# Patient Record
Sex: MALE | Race: WHITE | NOT HISPANIC OR LATINO | Employment: OTHER | URBAN - METROPOLITAN AREA
[De-identification: names, ages, dates, MRNs, and addresses within clinical notes are randomized per-mention and may not be internally consistent; named-entity substitution may affect disease eponyms.]

---

## 2022-08-11 ENCOUNTER — TELEPHONE (OUTPATIENT)
Dept: PRIMARY CARE CLINIC | Facility: CLINIC | Age: 77
End: 2022-08-11
Payer: MEDICARE

## 2022-08-11 NOTE — TELEPHONE ENCOUNTER
Patient was contacted in regards to getting scheduled with [facility] He stated that his daughter has been handling things for him & she lives in [place]. He wants to get with his daughter first (before scheduling). He says he will then call back to schedule to establish care.

## 2022-11-21 ENCOUNTER — OFFICE VISIT (OUTPATIENT)
Dept: PRIMARY CARE CLINIC | Facility: CLINIC | Age: 77
End: 2022-11-21
Payer: MEDICARE

## 2022-11-21 VITALS
SYSTOLIC BLOOD PRESSURE: 128 MMHG | OXYGEN SATURATION: 95 % | HEIGHT: 72 IN | TEMPERATURE: 98 F | HEART RATE: 62 BPM | BODY MASS INDEX: 32.69 KG/M2 | WEIGHT: 241.31 LBS | DIASTOLIC BLOOD PRESSURE: 60 MMHG

## 2022-11-21 DIAGNOSIS — E11.21 DIABETIC NEPHROPATHY ASSOCIATED WITH TYPE 2 DIABETES MELLITUS: ICD-10-CM

## 2022-11-21 DIAGNOSIS — Z12.5 PROSTATE CANCER SCREENING: ICD-10-CM

## 2022-11-21 DIAGNOSIS — H02.834 DERMATOCHALASIS OF BOTH UPPER EYELIDS: Chronic | ICD-10-CM

## 2022-11-21 DIAGNOSIS — E66.9 OBESITY (BMI 30.0-34.9): Chronic | ICD-10-CM

## 2022-11-21 DIAGNOSIS — H02.831 DERMATOCHALASIS OF BOTH UPPER EYELIDS: Chronic | ICD-10-CM

## 2022-11-21 DIAGNOSIS — G51.39 FACIAL NERVE SPASTICITY: Chronic | ICD-10-CM

## 2022-11-21 DIAGNOSIS — E11.638 TYPE 2 DIABETES MELLITUS WITH OTHER ORAL COMPLICATION, WITHOUT LONG-TERM CURRENT USE OF INSULIN: ICD-10-CM

## 2022-11-21 DIAGNOSIS — K21.9 GASTROESOPHAGEAL REFLUX DISEASE WITHOUT ESOPHAGITIS: Chronic | ICD-10-CM

## 2022-11-21 DIAGNOSIS — E11.65 TYPE 2 DIABETES MELLITUS WITH HYPERGLYCEMIA, WITHOUT LONG-TERM CURRENT USE OF INSULIN: Primary | ICD-10-CM

## 2022-11-21 DIAGNOSIS — R53.81 PHYSICAL DECONDITIONING: ICD-10-CM

## 2022-11-21 DIAGNOSIS — R35.0 URINARY FREQUENCY: ICD-10-CM

## 2022-11-21 DIAGNOSIS — G47.33 OBSTRUCTIVE SLEEP APNEA: Chronic | ICD-10-CM

## 2022-11-21 LAB
ALBUMIN/CREAT UR: 163.5 UG/MG (ref 0–30)
CREAT UR-MCNC: 104 MG/DL (ref 23–375)
MICROALBUMIN UR DL<=1MG/L-MCNC: 170 UG/ML

## 2022-11-21 PROCEDURE — 3078F PR MOST RECENT DIASTOLIC BLOOD PRESSURE < 80 MM HG: ICD-10-PCS | Mod: CPTII,S$GLB,, | Performed by: INTERNAL MEDICINE

## 2022-11-21 PROCEDURE — 1101F PR PT FALLS ASSESS DOC 0-1 FALLS W/OUT INJ PAST YR: ICD-10-PCS | Mod: CPTII,S$GLB,, | Performed by: INTERNAL MEDICINE

## 2022-11-21 PROCEDURE — 99999 PR PBB SHADOW E&M-EST. PATIENT-LVL V: CPT | Mod: PBBFAC,,, | Performed by: INTERNAL MEDICINE

## 2022-11-21 PROCEDURE — 82043 UR ALBUMIN QUANTITATIVE: CPT | Performed by: INTERNAL MEDICINE

## 2022-11-21 PROCEDURE — 1101F PT FALLS ASSESS-DOCD LE1/YR: CPT | Mod: CPTII,S$GLB,, | Performed by: INTERNAL MEDICINE

## 2022-11-21 PROCEDURE — 99999 PR PBB SHADOW E&M-EST. PATIENT-LVL V: ICD-10-PCS | Mod: PBBFAC,,, | Performed by: INTERNAL MEDICINE

## 2022-11-21 PROCEDURE — 99417 PR PROLONGED SVC, OUTPT, W/WO DIRECT PT CONTACT,  EA ADDTL 15 MIN: ICD-10-PCS | Mod: S$GLB,,, | Performed by: INTERNAL MEDICINE

## 2022-11-21 PROCEDURE — 99417 PROLNG OP E/M EACH 15 MIN: CPT | Mod: S$GLB,,, | Performed by: INTERNAL MEDICINE

## 2022-11-21 PROCEDURE — 1159F PR MEDICATION LIST DOCUMENTED IN MEDICAL RECORD: ICD-10-PCS | Mod: CPTII,S$GLB,, | Performed by: INTERNAL MEDICINE

## 2022-11-21 PROCEDURE — 83036 HEMOGLOBIN GLYCOSYLATED A1C: CPT | Performed by: INTERNAL MEDICINE

## 2022-11-21 PROCEDURE — 1159F MED LIST DOCD IN RCRD: CPT | Mod: CPTII,S$GLB,, | Performed by: INTERNAL MEDICINE

## 2022-11-21 PROCEDURE — 3288F PR FALLS RISK ASSESSMENT DOCUMENTED: ICD-10-PCS | Mod: CPTII,S$GLB,, | Performed by: INTERNAL MEDICINE

## 2022-11-21 PROCEDURE — 3074F PR MOST RECENT SYSTOLIC BLOOD PRESSURE < 130 MM HG: ICD-10-PCS | Mod: CPTII,S$GLB,, | Performed by: INTERNAL MEDICINE

## 2022-11-21 PROCEDURE — 99215 PR OFFICE/OUTPT VISIT, EST, LEVL V, 40-54 MIN: ICD-10-PCS | Mod: S$GLB,,, | Performed by: INTERNAL MEDICINE

## 2022-11-21 PROCEDURE — 84153 ASSAY OF PSA TOTAL: CPT | Performed by: INTERNAL MEDICINE

## 2022-11-21 PROCEDURE — 82570 ASSAY OF URINE CREATININE: CPT | Performed by: INTERNAL MEDICINE

## 2022-11-21 PROCEDURE — 1125F AMNT PAIN NOTED PAIN PRSNT: CPT | Mod: CPTII,S$GLB,, | Performed by: INTERNAL MEDICINE

## 2022-11-21 PROCEDURE — 3074F SYST BP LT 130 MM HG: CPT | Mod: CPTII,S$GLB,, | Performed by: INTERNAL MEDICINE

## 2022-11-21 PROCEDURE — 3288F FALL RISK ASSESSMENT DOCD: CPT | Mod: CPTII,S$GLB,, | Performed by: INTERNAL MEDICINE

## 2022-11-21 PROCEDURE — 99215 OFFICE O/P EST HI 40 MIN: CPT | Mod: S$GLB,,, | Performed by: INTERNAL MEDICINE

## 2022-11-21 PROCEDURE — 3078F DIAST BP <80 MM HG: CPT | Mod: CPTII,S$GLB,, | Performed by: INTERNAL MEDICINE

## 2022-11-21 PROCEDURE — 1125F PR PAIN SEVERITY QUANTIFIED, PAIN PRESENT: ICD-10-PCS | Mod: CPTII,S$GLB,, | Performed by: INTERNAL MEDICINE

## 2022-11-21 RX ORDER — POTASSIUM CHLORIDE 750 MG/1
10 TABLET, EXTENDED RELEASE ORAL
COMMUNITY
Start: 2020-11-20

## 2022-11-21 RX ORDER — AMIODARONE HYDROCHLORIDE 200 MG/1
200 TABLET ORAL DAILY
COMMUNITY
Start: 2020-05-01

## 2022-11-21 RX ORDER — BLOOD GLUCOSE CONTROL HIGH,LOW
EACH MISCELLANEOUS
COMMUNITY
End: 2022-11-21

## 2022-11-21 RX ORDER — CHOLESTYRAMINE 4 G/9G
POWDER, FOR SUSPENSION ORAL
COMMUNITY
Start: 2022-08-03 | End: 2023-04-17

## 2022-11-21 RX ORDER — METOPROLOL SUCCINATE 100 MG/1
100 TABLET, EXTENDED RELEASE ORAL DAILY
COMMUNITY
Start: 2020-11-20 | End: 2023-06-22 | Stop reason: ALTCHOICE

## 2022-11-21 RX ORDER — OMEPRAZOLE 40 MG/1
40 CAPSULE, DELAYED RELEASE ORAL
COMMUNITY

## 2022-11-21 RX ORDER — GLYBURIDE-METFORMIN HYDROCHLORIDE 2.5; 5 MG/1; MG/1
TABLET ORAL 2 TIMES DAILY WITH MEALS
COMMUNITY
Start: 2015-11-20 | End: 2023-05-01 | Stop reason: ALTCHOICE

## 2022-11-21 RX ORDER — SODIUM, POTASSIUM,MAG SULFATES 17.5-3.13G
SOLUTION, RECONSTITUTED, ORAL ORAL
COMMUNITY
Start: 2022-09-02

## 2022-11-21 RX ORDER — FUROSEMIDE 20 MG/1
20 TABLET ORAL
COMMUNITY
Start: 2020-11-20

## 2022-11-21 RX ORDER — OMEPRAZOLE 10 MG/1
40 CAPSULE, DELAYED RELEASE ORAL
COMMUNITY
Start: 2015-11-20 | End: 2023-04-17

## 2022-11-21 NOTE — PATIENT INSTRUCTIONS
Recommend designate HCPOA     Limit intake of simple carbs, sugary beverages, sweets    Move more - daily am natural light exposure

## 2022-11-21 NOTE — PROGRESS NOTES
Freddie Palacio  11/21/2022  [id number]    Kevyn Louise MD  Patient Care Team:  Kevyn Louise MD as PCP - General (Family Medicine)    Visit Type: establish care [facility]    Chief Complaint:  Chief Complaint   Patient presents with    Establish Care     History of Present Illness: Mr. Freddie Palacio 77 year old male. Current PCP Dr. Louise - last seen? PCP's TERRA Leslie?    Has seen a number of specialists w [facility] in [place] - daughter Renetta Grimes works there. Ms. Grimes is here w her father today. She visits in person every few months and manages his patient portal. Daughter Marilyn Jackson lives nearby in [place]. Mr. Palacio lives w his long-term , Gerda Parker, who is a nurse.     Officially retired but still working as an . Has noted some word finding difficulty recently and is a bit anxious about his cognition. Some of this may be related to persistent L sided facial spasticity making it more difficult to form and articulate certain words (which then increases his anxiety etc).    Going through dental extractions - using a removable dental device to assist w chewing.    Medical issues include:  PAF w Long term (current) use of anticoagulants;   Non-rheumatic aortic regurgitation;   Non-rheumatic mitral regurgitation;   Essential hypertension;   Dyslipidemia (low HDL)  Obstructive sleep apnea (difficulty w CPAP)  Diverticulosis w frequent diverticulitis flares   History of colon polyps;   Gastroesophageal reflux disease without esophagitis;   Type 2 diabetes mellitus    Sensorineural hearing loss (SNHL) of left ear with restricted hearing of right ear;   Bell's palsy w cont Facial nerve spasticity  L Blepharospasm  Ptosis L eyelid  Nasal congestion  Urinary frequency/polyuria/nocturia (due to diuretics? excessive fluid intake? DAFNE?)  Anxiety (particularly related to work)  Disrupted sleep (due to anxiety, DAFNE, nocturia)    Recent appointments:   10/14/22 [facility]  and [facility] Hearing loss  10/12/22 [facility] Bell's Palsy (Jan 21)  9/19/22 [facility] and Balance Dr. Ann  9/07/22 [facility] Dr. Dunne  9/02/22 [facility] GI Dr. Polanco   8/18/22 [facility] Plastic Surgery Dr. Salmeron    Upcoming appointments:  CT head for ears in Jan  Eye surgery in Jan?  Colonoscopy in Jan  Urology virtual Q 3 mos    Future Appointments       Date Provider Specialty Appt Notes    12/5/2022 Mirna Smith MD Primary Care f/u          The following were reviewed: Active problem list, medication list, allergies, family history, social history, and Health Maintenance.     History:  Past Medical History:   Diagnosis Date    A-fib     Bell's palsy     Benign prostatic hyperplasia without lower urinary tract symptoms     CHF (congestive heart failure)     Diabetes mellitus, type 2     Diverticulitis     GERD (gastroesophageal reflux disease)     Hypertension     Obstructive sleep apnea     Phimosis     Seasonal allergies      Past Surgical History:   Procedure Laterality Date    CARDIOVERSION      CATARACT EXTRACTION Right     Both eyes    CHOLECYSTECTOMY       Family History   Problem Relation Age of Onset    Diverticulitis Mother     COPD Mother     Diabetes Father     Stomach cancer Father     Diverticulitis Sister     Colon cancer Brother     Diabetes Maternal Grandmother     Heart failure Paternal Grandfather      Social History     Socioeconomic History    Marital status: Single   Tobacco Use    Smoking status: Never   Substance and Sexual Activity    Alcohol use: No    Drug use: No    Sexual activity: Not Currently     Partners: Female     Patient Active Problem List   Diagnosis    Obstructive sleep apnea    Facial nerve spasticity    Diabetes mellitus, type 2    GERD (gastroesophageal reflux disease)    Urinary frequency    Diabetic nephropathy associated with type 2 diabetes mellitus    Obesity (BMI 30.0-34.9)    Dermatochalasis of both upper eyelids        Review of patient's allergies indicates:   Allergen Reactions    Sulfa (sulfonamide antibiotics) Itching    Sulfamethoxazole-trimethoprim Itching and Rash    (d)-limonene flavor        Medications:  Current Outpatient Medications on File Prior to Visit   Medication Sig Dispense Refill    amiodarone (PACERONE) 200 MG Tab Take 200 mg by mouth once daily.      amlodipine (NORVASC) 10 MG tablet Take 5 mg by mouth 2 (two) times daily.      apixaban (ELIQUIS) 5 mg Tab Take 5 mg by mouth 2 (two) times daily.      cholestyramine (QUESTRAN) 4 gram packet DISSOLVE 1 PACKET IN LIQUID AND TAKE BY MOUTH THREE TIMES DAILY WITH MEALS      furosemide (LASIX) 20 MG tablet Take 20 mg by mouth.      glyBURIDE-metformin (GLUCOVANCE) 2.5-500 mg per tablet 2 (two) times daily with meals.      lisinopril-hydrochlorothiazide (PRINZIDE,ZESTORETIC) 20-25 mg Tab Take 1 tablet by mouth once daily.      metoprolol succinate (TOPROL-XL) 100 MG 24 hr tablet 100 mg once daily.      potassium chloride (KLOR-CON) 10 MEQ TbSR Take 10 mEq by mouth.      sodium,potassium,mag sulfates (SUPREP BOWEL PREP KIT) 17.5-3.13-1.6 gram SolR Take one bottle on the evening prior to your exam at 6 PM and the other bottle on the morning of your exam 4 hours prior to your arrival.      terazosin (HYTRIN) 10 MG capsule Take 10 mg by mouth every evening.      omeprazole (PRILOSEC) 10 MG capsule       omeprazole (PRILOSEC) 40 MG capsule Take 40 mg by mouth.       No current facility-administered medications on file prior to visit.     Medications have been reviewed and reconciled with patient at visit today.    Barriers to medications present (no )    Adverse reactions to current medications (yes)  Excessive urination and nocturia    Over the counter medications reviewed (Yes) and if needed added to active Medication list.    Review of Systems   Constitutional:  Positive for diaphoresis and fatigue. Negative for activity change, appetite change and unexpected  weight change.        Intermittent night sweats x 6 mos (possibly due to low Bss overnight?)   HENT:  Positive for dental problem and hearing loss. Negative for ear pain and sinus pressure/congestion.    Eyes:  Positive for visual disturbance. Negative for pain.        Due to upper eye lids and L eye ptosis/spasm  S/p cataract surgery  Wears readers   Respiratory:  Positive for apnea.         PND   Cardiovascular:  Negative for chest pain, palpitations and leg swelling.   Gastrointestinal:  Positive for reflux.        Frequent diverticulitis flares   Endocrine: Positive for polyuria.   Genitourinary:  Positive for frequency. Negative for bladder incontinence and difficulty urinating.        Nocturesis 2-3 x night but sometimes every 15 minutes.  Daytime Qhr urination.    Neurological:         Some word finding trouble   Psychiatric/Behavioral:  Positive for sleep disturbance. The patient is nervous/anxious.       Exam:  Initial /67 Pulse 62 sat 95% T 98.2  Vitals:    11/21/22 1442   BP: 128/60   Pulse:    Temp:      Weight: 109.5 kg (241 lb 4.8 oz)   Body mass index is 33.19 kg/m².    BP Readings from Last 3 Encounters:   11/21/22 128/60   07/06/15 132/72   07/01/15 118/78      Physical Exam  Vitals reviewed.   Constitutional:       General: He is not in acute distress.     Appearance: Normal appearance. He is obese. He is not ill-appearing.   HENT:      Head: Normocephalic and atraumatic.      Right Ear: Ear canal and external ear normal.      Left Ear: Ear canal and external ear normal.      Ears:      Comments: R TM top portion abnormal     Nose: Nose normal. No congestion or rhinorrhea.      Comments: Deviated septum     Mouth/Throat:      Mouth: Mucous membranes are moist.      Pharynx: Oropharynx is clear. No oropharyngeal exudate or posterior oropharyngeal erythema.   Eyes:      General:         Right eye: No discharge.         Left eye: No discharge.      Extraocular Movements: Extraocular movements  intact.      Conjunctiva/sclera: Conjunctivae normal.   Neck:      Vascular: No carotid bruit.   Cardiovascular:      Rate and Rhythm: Normal rate. Rhythm irregular.   Pulmonary:      Effort: Pulmonary effort is normal.      Breath sounds: Normal breath sounds. No wheezing, rhonchi or rales.   Abdominal:      General: Bowel sounds are normal.      Palpations: Abdomen is soft.      Tenderness: There is no abdominal tenderness. There is no guarding.      Comments: Scar mid R abd from prior GB surgery   Musculoskeletal:      Right lower leg: No edema.      Left lower leg: No edema.   Lymphadenopathy:      Cervical: No cervical adenopathy.   Skin:     General: Skin is warm and dry.   Neurological:      Mental Status: He is alert and oriented to person, place, and time. Mental status is at baseline.      Motor: No weakness.      Gait: Gait normal.      Comments: L sided facial droop  Mild discoordination L finger-to-nose test   Psychiatric:         Mood and Affect: Mood normal.         Behavior: Behavior normal.         Thought Content: Thought content normal.         Judgment: Judgment normal.      Laboratory Reviewed: (Yes)  8/02/22 CBC wnl CMP glucose 141 Na 146 K 3.4 eGFR 72 chol 115 TG 79 HDL 27 LDL 72 TSH 1.550  11/07/21 HCV NR    Assessment:   77 y.o. male with multiple co-morbid illnesses here for continued follow up of medical problems.      The primary encounter diagnosis was Type 2 diabetes mellitus with hyperglycemia, without long-term current use of insulin. Diagnoses of Urinary frequency, Prostate cancer screening, Physical deconditioning, Obstructive sleep apnea, Facial nerve spasticity, Type 2 diabetes mellitus with other oral complication, without long-term current use of insulin, Gastroesophageal reflux disease without esophagitis, Diabetic nephropathy associated with type 2 diabetes mellitus, Obesity (BMI 30.0-34.9), and Dermatochalasis of both upper eyelids were also pertinent to this  visit.      Plan:     Problem List Items Addressed This Visit          Neuro    Facial nerve spasticity (Chronic)     sequellae of Bell's palsy(?) on-set Jan 2021 -  receiving therapy through [facility] (speech, vision and hearing have been affected)            Ophtho    Dermatochalasis of both upper eyelids (Chronic)     Plan for surgery as affecting visual fields (exacerbated on L by facial nerve spasticity w ptosis and blepharospasm of L eye)            Renal/    Urinary frequency    Relevant Orders    PSA, Screening (Completed)    Microalbumin/creatinine urine ratio (Completed)    Diabetic nephropathy associated with type 2 diabetes mellitus     Note elevated microalb/crt ratio - f/u w pt next visit on optimal medical management         Relevant Medications    glyBURIDE-metformin (GLUCOVANCE) 2.5-500 mg per tablet       Endocrine    Obesity (BMI 30.0-34.9) (Chronic)     Discussed diet, movement, alternate Rx for diabetes         Diabetes mellitus, type 2 - Primary     Undergoing dental extractions - HgbA1c 6.3% - does not check Bs at home - reports intermittent tremors, night sweats - maybe related to low Bs's? consider hold pm glyburide-metformin?         Relevant Medications    glyBURIDE-metformin (GLUCOVANCE) 2.5-500 mg per tablet    Other Relevant Orders    Hemoglobin A1C (Completed)    Microalbumin/creatinine urine ratio (Completed)       GI    GERD (gastroesophageal reflux disease) (Chronic)     Cont PPI - clarify dose            Other    Obstructive sleep apnea (Chronic)     Reports difficulty w CPAP - consider contact sleep medicine specialist and/or supplier of CPAP to assist w troubleshooting?          Other Visit Diagnoses       Prostate cancer screening        Relevant Orders    PSA, Screening (Completed)    Physical deconditioning        Relevant Orders    Ambulatory referral/consult to Physical/Occupational Therapy            Health Maintenance         Date Due Completion Date    Hepatitis C Screening  Never done ---    Lipid Panel Never done ---    COVID-19 Vaccine (1) Never done ---    TETANUS VACCINE Never done ---    Shingles Vaccine (1 of 2) Never done ---    Pneumococcal Vaccines (Age 65+) (1 - PCV) Never done ---    Influenza Vaccine (1) Never done ---            -Patient's lab results were reviewed and discussed with patient  -Treatment options and alternatives were discussed with the patient. Patient expressed understanding. Patient was given the opportunity to ask questions and be an active participant in their medical care. Patient had no further questions or concerns at this time.   -Documentation of patient's health and condition was obtained from family member who was present during visit.  -Patient is an overall moderate risk for health complications from their medical conditions.     Follow up: Follow up in about 2 weeks (around 12/5/2022) for Follow Up.    After visit summary printed and given to patient upon discharge.  Patient care plan included in After visit summary.    TOTAL TIME evaluating and managing this patient for this encounter was greater than 90 minutes. This time was spent personally by me on the following activities: review of patient's past medical history, assessing age-appropriate health maintenance needs, review of any interval history, review and interpretation of lab results, review and interpretation of imaging test results, review and interpretation of cardiology test results, reviewing consulting specialist notes, obtaining history from the patient and family, examination of the patient, medication reconciliation, managing and/or ordering prescription medications, ordering imaging tests, ordering referral to subspecialty provider(s), educating patient and answering their questions about diagnosis, treatment plan, and goals of treatment, discussing planned follow-up and final documentation of the visit. This time was exclusive of any separately billable procedures for this  patient and exclusive of time spent treating any other patients.     Addendum  11/21/22: PSA 2.2 HgbA1c 6.3% urine microalb/crt ratio 163.5 (H)    Review of Medical Records:  [facility] [place]  8/05/22 Nephrology Dr. Tiny Barbosa creatinine normal 1.0 recommend glimepiride metformin in a.m. only, terazosin at night, check blood sugar and vitals with night sweats, limit fluids to 2-2.5 L per day, consider for continuous glucose monitor, PT, move more.  8/04/22 Ophthalmology Dr. Yonathan Ruffin lubricating drops 3-4 times daily.  8/01/22 Colorectal surgeon Dr. Grace recurrent diverticulitis, diverticulosis. Plan for colonoscopy, fiber.  8/01/22 Urologist Dr. Raymond limit fluids after 6:00 p.m. elevate feet after dinner elevate head in bed  7/30/22 Cardiology Dr. Dunne consider change amiodarone to different medication for rhythm     Echocardiogram 08/05/2022:  Left ventricle mildly dilated moderate concentric hypertrophy normal LVEF 55-60% right ventricle normal in size with normal systolic function.  Moderate aortic regurgitation.  Moderate mitral regurgitation.  Sinuses of Valsalva mildly dilated.  Proximal ascending aorta mildly dilated.  Sinus rhythm.    NM CV PET stress 08/02/2022:  66% predicted max H are.  Frequent PVCs, nonsustained VT.  LV perfusion mildly abnormal small fixed left ventricular perfusion defect with mild reduction in uptake apical inferior, septal, and apex locations.     Labs 8/02/22:  Cholesterol 115 triglycerides 79 HDL 27 LDL 72 cholesterol/HDL ratio 4.3 glucose 141 BUN 12 creatinine 1.06 sodium 146 potassium 3.4 chloride 100 carbon dioxide 32 calcium 8.8 total protein 6.4 albumin 4.2 globulin 2.2 alk phos 85 AST 25 ALT 33 EGFR 72 T bili 0.6 for

## 2022-11-22 PROBLEM — R35.0 URINARY FREQUENCY: Status: ACTIVE | Noted: 2022-11-22

## 2022-11-22 LAB
COMPLEXED PSA SERPL-MCNC: 2.2 NG/ML (ref 0–4)
ESTIMATED AVG GLUCOSE: 134 MG/DL (ref 68–131)
HBA1C MFR BLD: 6.3 % (ref 4–5.6)

## 2022-11-22 NOTE — ASSESSMENT & PLAN NOTE
Undergoing dental extractions - HgbA1c 6.3% - does not check Bs at home - reports intermittent tremors, night sweats - maybe related to low Bs's? consider hold pm glyburide-metformin?

## 2022-11-22 NOTE — ASSESSMENT & PLAN NOTE
Reports difficulty w CPAP - consider contact sleep medicine specialist and/or supplier of CPAP to assist w troubleshooting?

## 2022-11-22 NOTE — ASSESSMENT & PLAN NOTE
Plan for surgery as affecting visual fields (exacerbated on L by facial nerve spasticity w ptosis and blepharospasm of L eye)

## 2022-11-22 NOTE — ASSESSMENT & PLAN NOTE
sequellae of Bell's palsy(?) on-set Jan 2021 -  receiving therapy through [facility] (speech, vision and hearing have been affected)

## 2022-12-05 ENCOUNTER — OFFICE VISIT (OUTPATIENT)
Dept: PRIMARY CARE CLINIC | Facility: CLINIC | Age: 77
End: 2022-12-05
Payer: MEDICARE

## 2022-12-05 ENCOUNTER — CLINICAL SUPPORT (OUTPATIENT)
Dept: REHABILITATION | Facility: HOSPITAL | Age: 77
End: 2022-12-05
Payer: MEDICARE

## 2022-12-05 VITALS
SYSTOLIC BLOOD PRESSURE: 136 MMHG | OXYGEN SATURATION: 95 % | DIASTOLIC BLOOD PRESSURE: 63 MMHG | TEMPERATURE: 98 F | WEIGHT: 238.69 LBS | BODY MASS INDEX: 32.33 KG/M2 | HEIGHT: 72 IN | HEART RATE: 59 BPM

## 2022-12-05 DIAGNOSIS — H02.834 DERMATOCHALASIS OF BOTH UPPER EYELIDS: Chronic | ICD-10-CM

## 2022-12-05 DIAGNOSIS — N40.1 BENIGN PROSTATIC HYPERPLASIA WITH URINARY FREQUENCY: ICD-10-CM

## 2022-12-05 DIAGNOSIS — Z74.09 DECREASED MOBILITY AND ENDURANCE: Chronic | ICD-10-CM

## 2022-12-05 DIAGNOSIS — H02.831 DERMATOCHALASIS OF BOTH UPPER EYELIDS: Chronic | ICD-10-CM

## 2022-12-05 DIAGNOSIS — R35.0 BENIGN PROSTATIC HYPERPLASIA WITH URINARY FREQUENCY: ICD-10-CM

## 2022-12-05 DIAGNOSIS — R53.81 PHYSICAL DECONDITIONING: ICD-10-CM

## 2022-12-05 DIAGNOSIS — I48.0 PAROXYSMAL ATRIAL FIBRILLATION: ICD-10-CM

## 2022-12-05 DIAGNOSIS — E11.21 DIABETIC NEPHROPATHY ASSOCIATED WITH TYPE 2 DIABETES MELLITUS: ICD-10-CM

## 2022-12-05 DIAGNOSIS — M25.562 CHRONIC PAIN OF LEFT KNEE: ICD-10-CM

## 2022-12-05 DIAGNOSIS — R35.0 URINARY FREQUENCY: ICD-10-CM

## 2022-12-05 DIAGNOSIS — R26.89 BALANCE DISORDER: ICD-10-CM

## 2022-12-05 DIAGNOSIS — K43.9 VENTRAL HERNIA WITHOUT OBSTRUCTION OR GANGRENE: ICD-10-CM

## 2022-12-05 DIAGNOSIS — Z74.09 DECREASED MOBILITY AND ENDURANCE: ICD-10-CM

## 2022-12-05 DIAGNOSIS — G89.29 CHRONIC PAIN OF LEFT KNEE: ICD-10-CM

## 2022-12-05 DIAGNOSIS — N30.00 ACUTE CYSTITIS WITHOUT HEMATURIA: Primary | ICD-10-CM

## 2022-12-05 PROCEDURE — 3075F PR MOST RECENT SYSTOLIC BLOOD PRESS GE 130-139MM HG: ICD-10-PCS | Mod: CPTII,S$GLB,, | Performed by: INTERNAL MEDICINE

## 2022-12-05 PROCEDURE — 1101F PT FALLS ASSESS-DOCD LE1/YR: CPT | Mod: CPTII,S$GLB,, | Performed by: INTERNAL MEDICINE

## 2022-12-05 PROCEDURE — 3075F SYST BP GE 130 - 139MM HG: CPT | Mod: CPTII,S$GLB,, | Performed by: INTERNAL MEDICINE

## 2022-12-05 PROCEDURE — 97162 PT EVAL MOD COMPLEX 30 MIN: CPT | Mod: PN

## 2022-12-05 PROCEDURE — 81003 URINALYSIS AUTO W/O SCOPE: CPT | Performed by: INTERNAL MEDICINE

## 2022-12-05 PROCEDURE — 99999 PR PBB SHADOW E&M-EST. PATIENT-LVL V: CPT | Mod: PBBFAC,,, | Performed by: INTERNAL MEDICINE

## 2022-12-05 PROCEDURE — 3288F FALL RISK ASSESSMENT DOCD: CPT | Mod: CPTII,S$GLB,, | Performed by: INTERNAL MEDICINE

## 2022-12-05 PROCEDURE — 1125F AMNT PAIN NOTED PAIN PRSNT: CPT | Mod: CPTII,S$GLB,, | Performed by: INTERNAL MEDICINE

## 2022-12-05 PROCEDURE — 1125F PR PAIN SEVERITY QUANTIFIED, PAIN PRESENT: ICD-10-PCS | Mod: CPTII,S$GLB,, | Performed by: INTERNAL MEDICINE

## 2022-12-05 PROCEDURE — 1101F PR PT FALLS ASSESS DOC 0-1 FALLS W/OUT INJ PAST YR: ICD-10-PCS | Mod: CPTII,S$GLB,, | Performed by: INTERNAL MEDICINE

## 2022-12-05 PROCEDURE — 99215 OFFICE O/P EST HI 40 MIN: CPT | Mod: S$GLB,,, | Performed by: INTERNAL MEDICINE

## 2022-12-05 PROCEDURE — 99215 PR OFFICE/OUTPT VISIT, EST, LEVL V, 40-54 MIN: ICD-10-PCS | Mod: S$GLB,,, | Performed by: INTERNAL MEDICINE

## 2022-12-05 PROCEDURE — 3288F PR FALLS RISK ASSESSMENT DOCUMENTED: ICD-10-PCS | Mod: CPTII,S$GLB,, | Performed by: INTERNAL MEDICINE

## 2022-12-05 PROCEDURE — 3078F DIAST BP <80 MM HG: CPT | Mod: CPTII,S$GLB,, | Performed by: INTERNAL MEDICINE

## 2022-12-05 PROCEDURE — 99999 PR PBB SHADOW E&M-EST. PATIENT-LVL V: ICD-10-PCS | Mod: PBBFAC,,, | Performed by: INTERNAL MEDICINE

## 2022-12-05 PROCEDURE — 3078F PR MOST RECENT DIASTOLIC BLOOD PRESSURE < 80 MM HG: ICD-10-PCS | Mod: CPTII,S$GLB,, | Performed by: INTERNAL MEDICINE

## 2022-12-05 NOTE — PROGRESS NOTES
Freddie Palacio  12/05/2022  [id number]    Mirna Smith MD  Patient Care Team:  Mirna Smith MD as PCP - General (Internal Medicine)  Una Gar NP as Nurse Practitioner (Family Medicine)    Visit Type:a scheduled routine follow-up visit    Chief Complaint:  Chief Complaint   Patient presents with    2 week f/u      History of Present Illness: Mr. Freddie Palacio 77 year old male here for f/u on initial visit.     Mr. Palacio has seen a number of specialists w [facility] in [place] - daughter Renetta Grimes works there. She visits in person every few months and manages Mr. Palacio's patient portal. Daughter Marilyn Jackson lives nearby in [place]. Mr. Palacio lives w his long-term , Gerda Parker, who is a nurse.      Officially retired but still working as an . Has noted some word finding difficulty recently and is a bit anxious about his cognition. Some of this may be related to persistent L sided facial spasticity making it more difficult to form and articulate certain words (which then increases his anxiety etc).     Going through dental extractions - using a removable dental device to assist w chewing.    12/5/22   B/P at goal today, 136/63. Does not routinely measure B/P at home    Bell's Palsy Jan 2020 w cont L Facial nerve spasticity  L Blepharospasm  Ptosis L eyelid  Has chronic drooping of bilateral upper eyelids. Specialist in [place] feels that patient should have surgery to correct lids as it would improve his vision. Eyelids are causing vision obstruction, decreased peripheral vision to left eye. Pt states that is hereditary. Pt states his eyes keep shutting and he gets very sleepy when trying to work and falls asleep easily. Recommended that raising computer monitor above eye level may help improve line of vision and alertness.    Again notes speech is altered, notices mostly when in court and having to speak quickly    Has not been getting out walking in AM light as  advised due to left knee pain.   Not taking analgesia or topicals for left knee pain/pressure.   Plans to meet w PT here today.     Discussed HCPOA with pt a second time - will provide booklet for him to take home and review w his partner and daughter(s)    Voices concerns about prostatitis onset 3 days ago. Symptoms - suprapubic discomfort, urinary frequency, increased nocturia. Taking Azo OTC with some relief.     Takes metamucil for chronic diarrhea which is helpful for preventing diverticulitis. Denies diverticulitis symptoms at present.     Last Urologist - Dr. Kevyn Louise? [facility] due to hx of prostatitis, urinary frequency. R/o UTI: U/A reflex to urine today.     Medical issues include:  PAF w Long term (current) use of anticoagulants;   Non-rheumatic aortic regurgitation;   Non-rheumatic mitral regurgitation;   Essential hypertension;   Dyslipidemia (low HDL)  Obstructive sleep apnea (difficulty w CPAP)  Diverticulosis w frequent diverticulitis flares   History of colon polyps;   Gastroesophageal reflux disease without esophagitis;   Type 2 diabetes mellitus    Sensorineural hearing loss (SNHL) of left ear with restricted hearing of right ear;   Bell's palsy w cont Facial nerve spasticity  L Blepharospasm  Ptosis L eyelid  Nasal congestion  Urinary frequency/polyuria/nocturia (due to diuretics? excessive fluid intake? DAFNE?)  Anxiety (particularly related to work)  Disrupted sleep (due to anxiety, DAFNE, nocturia)     Recent appointments:   11/21//22 [facility] Dr. Smith establish care  10/14/22 [facility] Hearing loss  10/12/22 [facility] Bell's Palsy (Jan 21)  9/19/22 [facility] Dr. Ann  9/07/22 [facility] Dr. Dunne  9/02/22 [facility] GI Dr. Polanco   8/18/22 [facility] Plastic Surgery Dr. Salmeron    Review of Medical Records:  [facility] [place]  8/05/22 Nephrology Dr. Tiny Barbosa creatinine normal 1.0  recommend glimepiride metformin in a.m. only, terazosin at night, check blood sugar and vitals with night sweats, limit fluids to 2-2.5 L per day, consider for continuous glucose monitor, PT, move more.  8/04/22 Ophthalmology Dr. Yonathan Ruffin lubricating drops 3-4 times daily.  8/01/22 Colorectal surgeon Dr. Grace recurrent diverticulitis, diverticulosis. Plan for colonoscopy, fiber.  8/01/22 Urologist Dr. Raymond limit fluids after 6:00 p.m. elevate feet after dinner elevate head in bed  7/30/22 Cardiology Dr. Dunne consider change amiodarone to different medication for rhythm      Upcoming appointments:  Future Appointments       Date Provider Specialty Appt Notes    12/16/2022 Una Gar NP Primary Care [facility]    12/21/2022 Du Martines MD Surgery NP ventral hernia    12/27/2022 Marcus Goldberg MD Urology [facility]          CT head for ears in Jan  Eye surgery in Jan?  Colonoscopy in Jan  Urology virtual Q 3 mos    The following were reviewed: Active problem list, medication list, allergies, family history, social history, and Health Maintenance.     History:  Past Medical History:   Diagnosis Date    A-fib     Bell's palsy     Benign prostatic hyperplasia without lower urinary tract symptoms     CHF (congestive heart failure)     Diabetes mellitus, type 2     Diverticulitis     GERD (gastroesophageal reflux disease)     Hypertension     Obstructive sleep apnea     Phimosis     Seasonal allergies      Patient Active Problem List   Diagnosis    Obstructive sleep apnea    Facial nerve spasticity    Diabetes mellitus, type 2    GERD (gastroesophageal reflux disease)    Urinary frequency    Diabetic nephropathy associated with type 2 diabetes mellitus    Obesity (BMI 30.0-34.9)    Dermatochalasis of both upper eyelids    Decreased mobility and endurance    BPH (benign prostatic hyperplasia)    Essential hypertension    Non-rheumatic aortic regurgitation    Non-rheumatic mitral regurgitation     Paroxysmal atrial fibrillation    Sensorineural hearing loss (SNHL) of left ear with restricted hearing of right ear    Ventral hernia without obstruction or gangrene    Chronic pain of left knee    Balance disorder     Review of patient's allergies indicates:   Allergen Reactions    Sulfa (sulfonamide antibiotics) Itching    Sulfamethoxazole-trimethoprim Itching and Rash    (d)-limonene flavor      Medications:  Current Outpatient Medications on File Prior to Visit   Medication Sig Dispense Refill    amiodarone (PACERONE) 200 MG Tab Take 200 mg by mouth once daily.      amlodipine (NORVASC) 10 MG tablet Take 5 mg by mouth 2 (two) times daily.      apixaban (ELIQUIS) 5 mg Tab Take 5 mg by mouth 2 (two) times daily.      cholestyramine (QUESTRAN) 4 gram packet DISSOLVE 1 PACKET IN LIQUID AND TAKE BY MOUTH THREE TIMES DAILY WITH MEALS      furosemide (LASIX) 20 MG tablet Take 20 mg by mouth.      glyBURIDE-metformin (GLUCOVANCE) 2.5-500 mg per tablet 2 (two) times daily with meals.      lisinopril-hydrochlorothiazide (PRINZIDE,ZESTORETIC) 20-25 mg Tab Take 1 tablet by mouth once daily.      metoprolol succinate (TOPROL-XL) 100 MG 24 hr tablet 100 mg once daily.      omeprazole (PRILOSEC) 10 MG capsule       omeprazole (PRILOSEC) 40 MG capsule Take 40 mg by mouth.      potassium chloride (KLOR-CON) 10 MEQ TbSR Take 10 mEq by mouth.      sodium,potassium,mag sulfates (SUPREP BOWEL PREP KIT) 17.5-3.13-1.6 gram SolR Take one bottle on the evening prior to your exam at 6 PM and the other bottle on the morning of your exam 4 hours prior to your arrival.      terazosin (HYTRIN) 10 MG capsule Take 10 mg by mouth every evening.       No current facility-administered medications on file prior to visit.       Medications have been reviewed and reconciled with patient at visit today.    Barriers to medications present (no )    Adverse reactions to current medications (yes)  Excessive urination and nocturia maybe related to  medication    Over the counter medications reviewed (Yes) and if needed added to active Medication list.    Review of Systems   Constitutional:  Positive for diaphoresis and fatigue. Negative for activity change, appetite change and unexpected weight change.        Intermittent night sweats x 6 mos (possibly due to low Bss overnight?)   HENT:  Positive for dental problem and hearing loss. Negative for ear pain and sinus pressure/congestion.    Eyes:  Positive for visual disturbance. Negative for pain.        Due to upper eye lids and L eye ptosis/spasm  S/p cataract surgery  Wears readers   Respiratory:  Positive for apnea.         PND   Cardiovascular:  Negative for chest pain, palpitations and leg swelling.   Gastrointestinal:  Positive for diarrhea and reflux. Negative for nausea.        Frequent diverticulitis flares  C/o B flank pain with defecation  Reports suprapubic tenderness   Endocrine: Positive for polyuria.   Genitourinary:  Positive for frequency and urgency. Negative for bladder incontinence, difficulty urinating and dysuria.        Nocturesis 2-3 x night but sometimes every 15 minutes.  Daytime Qhr urination.    Musculoskeletal:  Positive for gait problem.        C/o L knee pain   Neurological:         Some word finding trouble  C/o feeling off balance   Psychiatric/Behavioral:  Positive for sleep disturbance. The patient is nervous/anxious.       Exam:  Vitals:    12/05/22 1348   BP: 136/63   Pulse: (!) 59   Temp: 98 °F (36.7 °C)     Weight: 108.3 kg (238 lb 11.2 oz)   Body mass index is 32.83 kg/m².    Physical Exam  Vitals reviewed.   Constitutional:       General: He is not in acute distress.     Appearance: Normal appearance. He is obese. He is not ill-appearing.   HENT:      Head: Normocephalic and atraumatic.      Right Ear: Ear canal and external ear normal. Tympanic membrane is not erythematous.      Left Ear: Tympanic membrane, ear canal and external ear normal.      Ears:      Comments: R  TM top portion abnormal - yellowish color     Nose: Nose normal. No congestion or rhinorrhea.      Comments: Deviated septum     Mouth/Throat:      Mouth: Mucous membranes are moist.      Pharynx: Oropharynx is clear. Posterior oropharyngeal erythema present. No oropharyngeal exudate.   Eyes:      General:         Right eye: No discharge.         Left eye: No discharge.      Extraocular Movements: Extraocular movements intact.      Conjunctiva/sclera: Conjunctivae normal.   Neck:      Vascular: No carotid bruit.   Cardiovascular:      Rate and Rhythm: Normal rate. Rhythm irregular.   Pulmonary:      Effort: Pulmonary effort is normal.      Breath sounds: Normal breath sounds. No wheezing, rhonchi or rales.   Abdominal:      General: Bowel sounds are normal. There is no distension.      Palpations: Abdomen is soft.      Tenderness: There is abdominal tenderness in the suprapubic area. There is no right CVA tenderness, left CVA tenderness or guarding.      Comments: Tenderness over scar mid R abd from prior GB surgery  Ventral hernia noted on observing rising from lying to sitting   Musculoskeletal:      Right lower leg: No edema.      Left lower leg: No edema.      Comments: L knee no warmth, erythema or sig swelling appreciated - strength and ROM wnl   Lymphadenopathy:      Cervical: No cervical adenopathy.   Skin:     General: Skin is warm and dry.   Neurological:      Mental Status: He is alert and oriented to person, place, and time. Mental status is at baseline.      Motor: No weakness.      Gait: Gait normal.      Comments: L sided facial droop with smile  Mild discoordination L finger-to-nose test   Psychiatric:         Mood and Affect: Mood normal.         Behavior: Behavior normal.         Thought Content: Thought content normal.         Judgment: Judgment normal.        Laboratory Reviewed: (Yes)  Labs 11/21/22: PSA 2.2 HgbA1c 6.3% urine microalb/crt ratio 163.5 (H)    8/02/22:  Cholesterol 115  triglycerides 79 HDL 27 LDL 72 cholesterol/HDL ratio 4.3 glucose 141 BUN 12 creatinine 1.06 sodium 146 potassium 3.4 chloride 100 carbon dioxide 32 calcium 8.8 total protein 6.4 albumin 4.2 globulin 2.2 alk phos 85 AST 25 ALT 33 EGFR 72 T bili 0.6     Echocardiogram 08/05/2022:  Left ventricle mildly dilated moderate concentric hypertrophy normal LVEF 55-60% right ventricle normal in size with normal systolic function.  Moderate aortic regurgitation.  Moderate mitral regurgitation.  Sinuses of Valsalva mildly dilated.  Proximal ascending aorta mildly dilated.  Sinus rhythm.     NM CV PET stress 08/02/2022:  66% predicted max H are.  Frequent PVCs, nonsustained VT.  LV perfusion mildly abnormal small fixed left ventricular perfusion defect with mild reduction in uptake apical inferior, septal, and apex locations.     Assessment:   77 y.o. male with multiple co-morbid illnesses here for continued follow up of medical problems.      The primary encounter diagnosis was Acute cystitis without hematuria. Diagnoses of Ventral hernia without obstruction or gangrene, Dermatochalasis of both upper eyelids, Paroxysmal atrial fibrillation, Urinary frequency, Diabetic nephropathy associated with type 2 diabetes mellitus, Benign prostatic hyperplasia with urinary frequency, Decreased mobility and endurance, Chronic pain of left knee, and Balance disorder were also pertinent to this visit.      Plan:     Problem List Items Addressed This Visit          Ophtho    Dermatochalasis of both upper eyelids (Chronic)     Plan for surgery as affecting visual fields (exacerbated on L by facial nerve spasticity w ptosis and blepharospasm of L eye)            Cardiac/Vascular    Paroxysmal atrial fibrillation     Rate controlled - cont eliquis            Renal/    Urinary frequency     Referral to urology - discussed behavioral strategies that might help - (note amlodipine may be contributing to nocturia and furosemide/HCTZ to daytime  frequency)         Diabetic nephropathy associated with type 2 diabetes mellitus     Consider switch lisinopril-HCTZ for longer acting ARB? (already taking furosemide) - recommend alternative to glyburide for diabetes given increased risk for hypoglycemia, weight gain         BPH (benign prostatic hyperplasia)     PSA wnl - consider alternate Rx to terazosin? (w possible side effects of afib, SVT, nervousness, somnolence and advise to use w caution w elderly) - referral to urology            GI    Ventral hernia without obstruction or gangrene (Chronic)    Relevant Orders    Ambulatory referral/consult to General Surgery       Orthopedic    Chronic pain of left knee     Referral O65+ PT - recommend walking, acetaminophen, ice/heat, topical diclofenac             Other    Decreased mobility and endurance (Chronic)     Referral O65+ PT          Balance disorder     Referral O65+ PT           Other Visit Diagnoses       Acute cystitis without hematuria    -  Primary    Relevant Orders    Urinalysis, Reflex to Urine Culture Urine, Clean Catch (Completed)    Ambulatory referral/consult to Urology            Health Maintenance         Date Due Completion Date    Hepatitis C Screening Never done ---    Lipid Panel Never done ---    TETANUS VACCINE Never done ---    Shingles Vaccine (1 of 2) Never done ---    Pneumococcal Vaccines (Age 65+) (1 - PCV) Never done ---    COVID-19 Vaccine (3 - Booster for Pfizer series) 08/07/2021 6/12/2021    Influenza Vaccine (1) Never done ---            -Patient's lab results were reviewed and discussed with patient  -Treatment options and alternatives were discussed with the patient. Patient expressed understanding. Patient was given the opportunity to ask questions and be an active participant in their medical care. Patient had no further questions or concerns at this time. -Patient is an overall moderate risk for health complications from their medical conditions.     Follow up: Follow  up in about 3 months (around 3/5/2023) for Follow Up.    Care Plan/Goals: Reviewed Yes   Goals    None       After visit summary printed and given to patient upon discharge.  Patient goals and care plan are included in After visit summary.    TOTAL TIME evaluating and managing this patient for this encounter was greater than 50 minutes. This time was spent personally by me on some of the following activities: review of patient's past medical history, assessing age-appropriate health maintenance needs, review of any interval history, review and interpretation of lab results, review and interpretation of imaging test results, review and interpretation of cardiology test results, reviewing consulting specialist notes, obtaining history from the patient and family, examination of the patient, medication reconciliation, managing and/or ordering prescription medications, ordering imaging tests, ordering referral to subspecialty provider(s), educating patient and answering their questions about diagnosis, treatment plan, and goals of treatment, discussing planned follow-up and final documentation of the visit. This time was exclusive of any separately billable procedures for this patient and exclusive of time spent treating any other patients.

## 2022-12-05 NOTE — PLAN OF CARE
[facility]   Physical Therapy Initial Evaluation   Date: 12/5/2022   Name: Freddie Palacio  Clinic Number: [id number]    Therapy Diagnosis:   Encounter Diagnoses   Name Primary?    Physical deconditioning     Decreased mobility and endurance       Physician: Mirna Smith MD     Physician Orders: PT Eval and Treat  Medical Diagnosis from Referral: R53.81 (ICD-10-CM) - Physical deconditioning   Evaluation Date: 12/5/2022  Authorization Period Expiration: 11/21/23  Plan of Care Expiration: not applicable   Progress Note Due: not applicable   Visit # / Visits authorized: 1/1   FOTO: 0/3 Not available    Precautions: Standard and Diabetes    Time In: 1431  Time Out: 1530  Total Billable Time (timed & untimed codes): 59 minutes    SUBJECTIVE   Date of onset: Cadott palsy history and has balance issues as a result.  Has issues with his Left knee locking up at times    History of current condition - Brenden reports has been dealing with his knee issue has been going on 6-7 years.  He has a stressful job as an  and has had several medical issues that have added up to a change in function    Imaging: [] Xray [] MRI [] CT: Performed on: No recent significant imaging     Pain:  Current 0/10, worst 8/10, best 0/10   Location: [] Right   [x] Left:  knee  Description: Sharp  Aggravating Factors: kneeling, too much standing   Easing Factors:  none    Prior Therapy:   [x] N/A    [] Yes:   Social History: Pt lives with an adult   Occupation: Pt is  (part time)  Prior Level of Function: independence with all mobility pain free 5 years ago, then started to have a decline.  Current Level of Function: pain in Left knee limits mobility and hobby of dancing.   Dominant Extremity:    [] Right    [x] Left    Pts goals:  to prevent further falls and improve steadiness with gait.    Medical History:   Past Medical History:   Diagnosis Date    A-fib     Bell's palsy     Benign prostatic  hyperplasia without lower urinary tract symptoms     CHF (congestive heart failure)     Diabetes mellitus, type 2     Diverticulitis     GERD (gastroesophageal reflux disease)     Hypertension     Obstructive sleep apnea     Phimosis     Seasonal allergies        Surgical History:   Freddie Palacio  has a past surgical history that includes Cholecystectomy; Cataract extraction (Right); and Cardioversion.    Medications:   Freddie has a current medication list which includes the following prescription(s): amiodarone, amlodipine, apixaban, cholestyramine, furosemide, glyburide-metformin, lisinopril-hydrochlorothiazide, metoprolol succinate, omeprazole, omeprazole, potassium chloride, sodium,potassium,mag sulfates, tamsulosin, and terazosin.    Allergies:   Review of patient's allergies indicates:   Allergen Reactions    Sulfa (sulfonamide antibiotics) Itching    Sulfamethoxazole-trimethoprim Itching and Rash    (d)-limonene flavor         OBJECTIVE     Posture:  Pt presents with postural abnormalities which include:    [x] Forward Head   [] Increased Lumbar Lordosis   [x] Rounded Shoulder   [] Genu Recurvatum   [x] Increased Thoracic Kyphosis [] Genu Valgus   [] Trunk Deviated    [] Pes Planus   [] Scapular Winging    [] Other:     Flexibility:  Hamstrings: limited bilateral   Heel cords: Within Normal Limits         Strength:     Right LE Left LE   Hip flexion 4/5 4/5   Hip extension 3+/5 3+/5   Hip abduction 4/5 4/5   Knee flexion 4+/5 4/5   Knee extension 4+/5 4/5   Ankle dorsiflexion 5/5 5/5   Ankle plantarflexion 5/5 5/5     4+/B    Sensation:  [x] Intact to Light Touch   [] Impaired:    Gait Analysis: The patient ambulated with the following assistive device: none; the pt presents with the following gait abnormalities: forward trunk     Fall risk assessment:    Performance Measurements Pt score Norms   Dumont Balance Scale Dumont Balance Scale    1. SITTING TO STANDIN - able to stand without using hands and  stabilize independently    2. STANDING UNSUPPORTED:4 - able to stand safely 2 minutes without hold    3. SITTING WITH BACK UNSUPPORTED BUT FEET SUPPORTED ON FLOOR OR ON A STOOL: 4 - able to sit safely and securely 2 minutes    4. STANDING TO SITTIN - sits safely with minimal use of hands    5. TRANSFERS: 4 - able to trnasfer safely with minor use of hands    6. STANDING UNSUPPORTED WITH EYES CLOSED: 4 - albe to stand 10 seconds safely    7. STANDING UNSUPPORTED WITH FEET TOGETHER: 4 - able to place feet together independently and stand 1 minute safely    8. REACHING FORWARD WITH OUTSTRETCHED ARM WHILE STANDIN - can reach forward confidently 25 cm/10 inches    9.  OBJECT FROM THE FLOOR FROM A STANDING POSITION:4 - able to  slipper safely and easily    10. TURNING TO LOOK BEHIND OVER LEFT AND RIGHT SHOULDERS WHILE STANDIN - looks behind from both sides and weights shifts well    11. TURN 360 DEGREES: 4 - able to turn 360 in seconds or less    12. PLACE ALTERNATE FOOT ON STEP OR STOOL WHILE STANDING UNSUPPORTED: 4 - able to stand independently safely and complete 8 steps in 20 seconds     13. STANDING UNSUPPORTED ONE FOOT IN FRONT: 2 - able to take small step indenpendently and hold 30 seconds    14. STANDING ON ONE LEG: 3- able to lift leg independently and hold 5-10 seconds      TOTAL SCORE: 53/56       51-56 = low fall risk  41-50 = increased fall risk  0 -40 = high fall risk   Single leg stance Right: 5 seconds ; Left 8 seconds- dominant leg  Less than 4.9 sec high risk  5-6.4 sec increased risk  6.5 or greater low risk   Timed up and go 10.17 seconds Greater than 14 sec high risk  11.1-14 sec increased risk  11 sec or less low risk         Self reported measurements  Outcome Norms   FES-I 36/64 41-64 high risk  25-40 increased risk  16-24 low risk       Function:     CMS Impairment/Limitation/Restriction for FOTO  Survey Not Given as it was not available    Therapist reviewed FOTO scores  for Brenden on 12/5/2022.   FOTO documents entered into [email or web address] - see Media section.    Limitation Score: %       During evaluation, patient was found to have a large ventral hernia.  Dr. Smith was consulted and placed an order to General Surgery to determine need of surgery vs clearance for physical exertion.   TREATMENT     Total Treatment time (time-based codes) separate from Evaluation: (0) minutes       PATIENT EDUCATION AND HOME EXERCISES     Education provided: Pt was not given Home Exercise Program as he is awaiting medical clearance to participate with Health . Pt was encouraged to participate in cardiovascular exercise and wellness exercise in fitness center with assistance of health  at [address] once receiving clearance.       Written Home Exercises Provided: no.    Awaiting medical clearance due to ventral hernia.  ASSESSMENT     Freddie is a 77 y.o. male referred to outpatient Physical Therapy with a medical diagnosis of R53.81 (ICD-10-CM) - Physical deconditioning . Pt presents with impairments including: decreased strength, decreased muscle length, impaired balance, and postural abnormalities.  He would like to start a fitness routine with the Health  once receiving clearance from General Surgery regarding his hernia.     Pt prognosis is not applicable as he is not continuing with therapy at this time.       Plan of care discussed with patient: Yes  Pt's spiritual, cultural and educational needs considered and patient is agreeable to the plan of care and goals as stated below:     Anticipated Barriers for therapy: sedentary lifestyle and chronicity of condition    Medical Necessity is demonstrated by the following  History  Co-morbidities and personal factors that may impact the plan of care Co-morbidities:   A-fib   Bell's palsy   Benign prostatic hyperplasia without lower urinary tract symptoms   CHF (congestive heart failure)   Diabetes mellitus, type 2   Diverticulitis    GERD (gastroesophageal reflux disease)   Hypertension   Obstructive sleep apnea   Phimosis   Seasonal allergies       Personal Factors:   age  lifestyle     high   Examination  Body Structures and Functions, activity limitations and participation restrictions that may impact the plan of care Body Regions:   lower extremities  trunk    Body Systems:    strength  gross coordinated movement  balance  hernia    Participation Restrictions:   hernia    Activity limitations:   Learning and applying knowledge  no deficits    General Tasks and Commands  no deficits    Communication  no deficits    Mobility  lifting and carrying objects    Self care  no deficits    Domestic Life  no deficits    Interactions/Relationships  family relationships    Life Areas  no deficits    Community and Social Life  community life  recreation and leisure  Latter day and spirituality         high   Clinical Presentation evolving clinical presentation with changing clinical characteristics moderate   Decision Making/ Complexity Score: moderate         No Goals needed as patient will not be continuing with PT at this time.     PLAN   Plan of care Certification: not applicable    Health  will contact pt either by phone or in person next week to initiate exercise program as appropriate if he is able to obtain medical clearance to participate. The Health  will answer questions regarding exercises and encourage follow up in fitness center. Health  will communicate any concerns with treating therapist and will recommend follow up with physical therapist as needed.    Yoly Coyne, PT, DPT      I CERTIFY THE NEED FOR THESE SERVICES FURNISHED UNDER THIS PLAN OF TREATMENT AND WHILE UNDER MY CARE   Physician's comments:     Physician's Signature: ___________________________________________________

## 2022-12-05 NOTE — PATIENT INSTRUCTIONS
Plan for December Annual Wellness Visit @ [facility] with Una Gar NP     Referral placed to Urology - let us know if you don't hear from them    Recommend raise computer monitor above eye level - may help improve alertness as will taking breaks, increasing daily physical activity and ensuring daily am natural light exposure     Limit intake of simple carbs, sugary beverages, sweets    Recommend designate HCPOA - Starting Conversation Booklet provided

## 2022-12-06 ENCOUNTER — TELEPHONE (OUTPATIENT)
Dept: UROLOGY | Facility: CLINIC | Age: 77
End: 2022-12-06
Payer: MEDICARE

## 2022-12-06 ENCOUNTER — TELEPHONE (OUTPATIENT)
Dept: SURGERY | Facility: CLINIC | Age: 77
End: 2022-12-06
Payer: MEDICARE

## 2022-12-06 NOTE — TELEPHONE ENCOUNTER
Spoke to pt's daughter she said she will try to fax medical records over for her dad and fed ex the disk over to us. She also stated that hr just needs to establish care. Gave her the fax number she voiced understanding     ----- Message from Bianca Wheatley sent at 12/6/2022  3:11 PM CST -----  Pt's daughter Catia would like to share some medical records ahead of the appt tomorrow. Call back number is .[phone]. Thx. EL

## 2022-12-07 ENCOUNTER — OFFICE VISIT (OUTPATIENT)
Dept: UROLOGY | Facility: CLINIC | Age: 77
End: 2022-12-07
Payer: MEDICARE

## 2022-12-07 VITALS
BODY MASS INDEX: 33.32 KG/M2 | HEIGHT: 71 IN | WEIGHT: 238 LBS | DIASTOLIC BLOOD PRESSURE: 70 MMHG | SYSTOLIC BLOOD PRESSURE: 136 MMHG | HEART RATE: 65 BPM

## 2022-12-07 DIAGNOSIS — N30.00 ACUTE CYSTITIS WITHOUT HEMATURIA: ICD-10-CM

## 2022-12-07 DIAGNOSIS — N40.1 BENIGN PROSTATIC HYPERPLASIA WITH WEAK URINARY STREAM: Primary | ICD-10-CM

## 2022-12-07 DIAGNOSIS — R39.12 BENIGN PROSTATIC HYPERPLASIA WITH WEAK URINARY STREAM: Primary | ICD-10-CM

## 2022-12-07 PROBLEM — Z74.09 DECREASED MOBILITY AND ENDURANCE: Status: ACTIVE | Noted: 2022-12-07

## 2022-12-07 LAB
BILIRUB UR QL STRIP: NEGATIVE
CLARITY UR REFRACT.AUTO: CLEAR
COLOR UR AUTO: YELLOW
GLUCOSE UR QL STRIP: NEGATIVE
HGB UR QL STRIP: NEGATIVE
KETONES UR QL STRIP: NEGATIVE
LEUKOCYTE ESTERASE UR QL STRIP: NEGATIVE
NITRITE UR QL STRIP: NEGATIVE
PH UR STRIP: 6 [PH] (ref 5–8)
PROT UR QL STRIP: ABNORMAL
SP GR UR STRIP: 1.02 (ref 1–1.03)
URN SPEC COLLECT METH UR: ABNORMAL

## 2022-12-07 PROCEDURE — 3288F FALL RISK ASSESSMENT DOCD: CPT | Mod: CPTII,S$GLB,, | Performed by: UROLOGY

## 2022-12-07 PROCEDURE — 1101F PT FALLS ASSESS-DOCD LE1/YR: CPT | Mod: CPTII,S$GLB,, | Performed by: UROLOGY

## 2022-12-07 PROCEDURE — 99999 PR PBB SHADOW E&M-EST. PATIENT-LVL IV: CPT | Mod: PBBFAC,,, | Performed by: UROLOGY

## 2022-12-07 PROCEDURE — 1159F PR MEDICATION LIST DOCUMENTED IN MEDICAL RECORD: ICD-10-PCS | Mod: CPTII,S$GLB,, | Performed by: UROLOGY

## 2022-12-07 PROCEDURE — 99204 PR OFFICE/OUTPT VISIT, NEW, LEVL IV, 45-59 MIN: ICD-10-PCS | Mod: S$GLB,,, | Performed by: UROLOGY

## 2022-12-07 PROCEDURE — 1159F MED LIST DOCD IN RCRD: CPT | Mod: CPTII,S$GLB,, | Performed by: UROLOGY

## 2022-12-07 PROCEDURE — 3288F PR FALLS RISK ASSESSMENT DOCUMENTED: ICD-10-PCS | Mod: CPTII,S$GLB,, | Performed by: UROLOGY

## 2022-12-07 PROCEDURE — 3075F PR MOST RECENT SYSTOLIC BLOOD PRESS GE 130-139MM HG: ICD-10-PCS | Mod: CPTII,S$GLB,, | Performed by: UROLOGY

## 2022-12-07 PROCEDURE — 1160F PR REVIEW ALL MEDS BY PRESCRIBER/CLIN PHARMACIST DOCUMENTED: ICD-10-PCS | Mod: CPTII,S$GLB,, | Performed by: UROLOGY

## 2022-12-07 PROCEDURE — 99999 PR PBB SHADOW E&M-EST. PATIENT-LVL IV: ICD-10-PCS | Mod: PBBFAC,,, | Performed by: UROLOGY

## 2022-12-07 PROCEDURE — 3078F PR MOST RECENT DIASTOLIC BLOOD PRESSURE < 80 MM HG: ICD-10-PCS | Mod: CPTII,S$GLB,, | Performed by: UROLOGY

## 2022-12-07 PROCEDURE — 3078F DIAST BP <80 MM HG: CPT | Mod: CPTII,S$GLB,, | Performed by: UROLOGY

## 2022-12-07 PROCEDURE — 3075F SYST BP GE 130 - 139MM HG: CPT | Mod: CPTII,S$GLB,, | Performed by: UROLOGY

## 2022-12-07 PROCEDURE — 1160F RVW MEDS BY RX/DR IN RCRD: CPT | Mod: CPTII,S$GLB,, | Performed by: UROLOGY

## 2022-12-07 PROCEDURE — 1101F PR PT FALLS ASSESS DOC 0-1 FALLS W/OUT INJ PAST YR: ICD-10-PCS | Mod: CPTII,S$GLB,, | Performed by: UROLOGY

## 2022-12-07 PROCEDURE — 99204 OFFICE O/P NEW MOD 45 MIN: CPT | Mod: S$GLB,,, | Performed by: UROLOGY

## 2022-12-07 RX ORDER — TAMSULOSIN HYDROCHLORIDE 0.4 MG/1
0.4 CAPSULE ORAL DAILY
Qty: 30 CAPSULE | Refills: 11 | Status: SHIPPED | OUTPATIENT
Start: 2022-12-07 | End: 2023-03-03 | Stop reason: SDUPTHER

## 2022-12-07 NOTE — PROGRESS NOTES
Small amount of protein in your urine but no sign of bacterial infection. Recommend f/u with urologist for further evaluation.

## 2022-12-11 PROBLEM — K43.9 VENTRAL HERNIA WITHOUT OBSTRUCTION OR GANGRENE: Chronic | Status: ACTIVE | Noted: 2022-12-11

## 2022-12-11 PROBLEM — M25.562 CHRONIC PAIN OF LEFT KNEE: Status: ACTIVE | Noted: 2022-12-05

## 2022-12-11 PROBLEM — I48.0 PAROXYSMAL ATRIAL FIBRILLATION: Status: ACTIVE | Noted: 2020-10-29

## 2022-12-11 PROBLEM — I10 ESSENTIAL HYPERTENSION: Status: ACTIVE | Noted: 2019-12-02

## 2022-12-11 PROBLEM — I35.1 NON-RHEUMATIC AORTIC REGURGITATION: Status: ACTIVE | Noted: 2022-09-07

## 2022-12-11 PROBLEM — I34.0 NON-RHEUMATIC MITRAL REGURGITATION: Status: ACTIVE | Noted: 2022-09-07

## 2022-12-11 PROBLEM — N40.0 BPH (BENIGN PROSTATIC HYPERPLASIA): Status: ACTIVE | Noted: 2022-07-30

## 2022-12-11 PROBLEM — H90.A22 SENSORINEURAL HEARING LOSS (SNHL) OF LEFT EAR WITH RESTRICTED HEARING OF RIGHT EAR: Status: ACTIVE | Noted: 2022-09-19

## 2022-12-11 PROBLEM — Z74.09 DECREASED MOBILITY AND ENDURANCE: Chronic | Status: ACTIVE | Noted: 2022-12-05

## 2022-12-11 PROBLEM — K43.9 VENTRAL HERNIA WITHOUT OBSTRUCTION OR GANGRENE: Status: ACTIVE | Noted: 2022-12-11

## 2022-12-11 PROBLEM — G89.29 CHRONIC PAIN OF LEFT KNEE: Status: ACTIVE | Noted: 2022-12-05

## 2022-12-11 PROBLEM — R26.89 BALANCE DISORDER: Status: ACTIVE | Noted: 2022-12-05

## 2022-12-11 PROBLEM — Z74.09 DECREASED MOBILITY AND ENDURANCE: Chronic | Status: ACTIVE | Noted: 2022-12-07

## 2022-12-11 NOTE — ASSESSMENT & PLAN NOTE
PSA wnl - consider alternate Rx to terazosin? (w possible side effects of afib, SVT, nervousness, somnolence and advise to use w caution w elderly) - referral to urology

## 2022-12-11 NOTE — ASSESSMENT & PLAN NOTE
Consider switch lisinopril-HCTZ for longer acting ARB? (already taking furosemide) - recommend alternative to glyburide for diabetes given increased risk for hypoglycemia, weight gain

## 2022-12-11 NOTE — ASSESSMENT & PLAN NOTE
Referral to urology - discussed behavioral strategies that might help - (note amlodipine may be contributing to nocturia and furosemide/HCTZ to daytime frequency)

## 2022-12-14 NOTE — PROGRESS NOTES
Chief Complaint:  LUTS    HPI:   Freddie Palacio is a 77 y.o. male that presents today as a referral from Dr. Smith for LUTS.  Patient notes difficulty with urination as long as you have been alive.  Slow stream is his biggest issue.  Even though he has been having issues for many years, this is the 1st time he is seeing a urologist.  He is never been on any medications for his prostate previously.  His symptoms are very bothersome to him.  He also notes decreased ejaculation when he is sexually active.  States that it dribbles out after.  Notes a history of pelvic pressure, rode horses for many years.  Denies any gross hematuria or prior urologic surgeries.  Denies family history of  cancers.  IPSS - 0/3/0/5/5/0/4 = 17  QOL - 6(terrible)    PMH:  Past Medical History:   Diagnosis Date    A-fib     Bell's palsy     Benign prostatic hyperplasia without lower urinary tract symptoms     CHF (congestive heart failure)     Diabetes mellitus, type 2     Diverticulitis     GERD (gastroesophageal reflux disease)     Hypertension     Obstructive sleep apnea     Phimosis     Seasonal allergies        PSH:  Past Surgical History:   Procedure Laterality Date    CARDIOVERSION      CATARACT EXTRACTION Right     Both eyes    CHOLECYSTECTOMY         Family History:  Family History   Problem Relation Age of Onset    Diverticulitis Mother     COPD Mother     Diabetes Father     Stomach cancer Father     Diverticulitis Sister     Colon cancer Brother     Diabetes Maternal Grandmother     Heart failure Paternal Grandfather        Social History:  Social History     Tobacco Use    Smoking status: Never   Substance Use Topics    Alcohol use: No    Drug use: No        Review of Systems:  General: No fever, chills  Skin: No rashes  Chest:  Denies cough and sputum production  Heart: Denies chest pain  Resp: Denies dyspnea  Abdomen: Denies diarrhea, abdominal pain, hematemesis, or blood in stool.  Musculoskeletal: No joint stiffness or  swelling. Denies back pain.  : see HPI  Neuro: no dizziness or weakness    Allergies:  Sulfa (sulfonamide antibiotics), Sulfamethoxazole-trimethoprim, and (d)-limonene flavor    Medications:    Current Outpatient Medications:     amiodarone (PACERONE) 200 MG Tab, Take 200 mg by mouth once daily., Disp: , Rfl:     amlodipine (NORVASC) 10 MG tablet, Take 5 mg by mouth 2 (two) times daily., Disp: , Rfl:     apixaban (ELIQUIS) 5 mg Tab, Take 5 mg by mouth 2 (two) times daily., Disp: , Rfl:     cholestyramine (QUESTRAN) 4 gram packet, DISSOLVE 1 PACKET IN LIQUID AND TAKE BY MOUTH THREE TIMES DAILY WITH MEALS, Disp: , Rfl:     furosemide (LASIX) 20 MG tablet, Take 20 mg by mouth., Disp: , Rfl:     glyBURIDE-metformin (GLUCOVANCE) 2.5-500 mg per tablet, 2 (two) times daily with meals., Disp: , Rfl:     lisinopril-hydrochlorothiazide (PRINZIDE,ZESTORETIC) 20-25 mg Tab, Take 1 tablet by mouth once daily., Disp: , Rfl:     metoprolol succinate (TOPROL-XL) 100 MG 24 hr tablet, 100 mg once daily., Disp: , Rfl:     omeprazole (PRILOSEC) 10 MG capsule, , Disp: , Rfl:     omeprazole (PRILOSEC) 40 MG capsule, Take 40 mg by mouth., Disp: , Rfl:     potassium chloride (KLOR-CON) 10 MEQ TbSR, Take 10 mEq by mouth., Disp: , Rfl:     sodium,potassium,mag sulfates (SUPREP BOWEL PREP KIT) 17.5-3.13-1.6 gram SolR, Take one bottle on the evening prior to your exam at 6 PM and the other bottle on the morning of your exam 4 hours prior to your arrival., Disp: , Rfl:     terazosin (HYTRIN) 10 MG capsule, Take 10 mg by mouth every evening., Disp: , Rfl:     tamsulosin (FLOMAX) 0.4 mg Cap, Take 1 capsule (0.4 mg total) by mouth once daily., Disp: 30 capsule, Rfl: 11    Physical Exam:  Vitals:    12/07/22 1044   BP: 136/70   Pulse: 65     Body mass index is 33.19 kg/m².  General: awake, alert, cooperative  Head: NC/AT  Ears: external ears normal  Eyes: sclera normal  Lungs: normal inspiration, NAD  Heart: well-perfused  Abdomen: Soft, NT,  ND  : Normal uncirc'd phallus, meatus normal in size and position, BL testicles palpable, no masses, nontender, no abnormalities of epididymi  FELA: Normal rectal tone, no hemorrhoids. Prostate smooth and normal, no nodules 20 gm SV not palpable. Perineum and anus normal.  Lymphatic: groin nodes negative  Skin: The skin is warm and dry  Ext: No c/c/e.  Neuro: grossly intact, AOx3    RADIOLOGY:  No recent relevant imaging available for review.    LABS:  I personally reviewed the following lab values:  Lab Results   Component Value Date    WBC 5.87 01/13/2015    HGB 14.7 01/13/2015    HCT 44.5 01/13/2015     01/13/2015     01/13/2015    K 3.4 (L) 01/13/2015     01/13/2015    CREATININE 1.1 01/13/2015    BUN 17 01/13/2015    CO2 32 (H) 01/13/2015    TSH 1.835 01/13/2015    PSA 2.2 11/21/2022    HGBA1C 6.3 (H) 11/21/2022    ALT 36 01/13/2015    AST 21 01/13/2015       Assessment/Plan:   Freddie Palacio is a 77 y.o. male with lower urinary tract symptoms.  Unclear etiology but due to his age BPH is a consideration.  Interestingly he also notes decreased ejaculation so there is the possibility of urethral stricture disease.  We will start him on Flomax and have him follow-up for office cystoscopy.    Thank you for allowing me the opportunity to participate in this patient's care.     Marcus Goldberg MD  Urology

## 2022-12-23 ENCOUNTER — TELEPHONE (OUTPATIENT)
Dept: UROLOGY | Facility: CLINIC | Age: 77
End: 2022-12-23
Payer: MEDICARE

## 2022-12-23 NOTE — TELEPHONE ENCOUNTER
Spoke to the patient and his daughter. The patient would like to cancel the cysto for now because he is unable to come on 12/27. Pt would like to call back and reschedule when he knows when he will be able to do this procedure. Apt was cancelled pt voiced understanding       ----- Message from Lovely King sent at 12/23/2022 10:12 AM CST -----  Contact: Freddie  Type:  Sooner Apoointment Request    Caller is requesting a sooner appointment. Caller will not accept being placed on the waitlist and is requesting a message be sent to doctor.  Name of Caller:Freddie  When is the first available appointment?scheduled 12/27/22  Symptoms:Procedure  Would the patient rather a call back or a response via [employer]? call  Best Call Back Number:[phone]  Additional Information: Patient request to cancel procedure and request to be informed the availability after 1/1/23.   Thank you,  GH

## 2023-01-19 ENCOUNTER — TELEPHONE (OUTPATIENT)
Dept: UROLOGY | Facility: CLINIC | Age: 78
End: 2023-01-19
Payer: MEDICARE

## 2023-01-19 NOTE — TELEPHONE ENCOUNTER
Appt rescheduled. Pt notified on [employer]     ----- Message from Bety Alvarado sent at 1/18/2023  4:38 PM CST -----  Contact: Self  Pt is asking for an return call in reference to rescheduling apt that was canceled on 12/27, please call back at .[phone] Thx CJ

## 2023-02-10 ENCOUNTER — PROCEDURE VISIT (OUTPATIENT)
Dept: UROLOGY | Facility: CLINIC | Age: 78
End: 2023-02-10
Payer: MEDICARE

## 2023-02-10 VITALS
TEMPERATURE: 97 F | DIASTOLIC BLOOD PRESSURE: 76 MMHG | BODY MASS INDEX: 33.32 KG/M2 | HEIGHT: 71 IN | HEART RATE: 74 BPM | SYSTOLIC BLOOD PRESSURE: 141 MMHG | WEIGHT: 238 LBS

## 2023-02-10 DIAGNOSIS — N40.1 BENIGN PROSTATIC HYPERPLASIA WITH WEAK URINARY STREAM: Primary | ICD-10-CM

## 2023-02-10 DIAGNOSIS — R39.12 BENIGN PROSTATIC HYPERPLASIA WITH WEAK URINARY STREAM: Primary | ICD-10-CM

## 2023-02-10 PROCEDURE — 52000 PR CYSTOURETHROSCOPY: ICD-10-PCS | Mod: S$GLB,,, | Performed by: UROLOGY

## 2023-02-10 PROCEDURE — 52000 CYSTOURETHROSCOPY: CPT | Mod: S$GLB,,, | Performed by: UROLOGY

## 2023-02-10 RX ORDER — LIDOCAINE HYDROCHLORIDE 20 MG/ML
JELLY TOPICAL
Status: DISCONTINUED | OUTPATIENT
Start: 2023-02-10 | End: 2023-04-17

## 2023-02-10 NOTE — PROCEDURES
Procedures    Procedure:  Diagnostic Cystoscopy    Indications: LUTS    UA: normal, see lab results for values    Procedure in Detail: After proper consents were obtained, the patient was prepped and draped in normal sterile fashion for diagnostic cystoscopy. 5 ml of lidocaine jelly was instilled in the urethra. The flexible cystoscope was then introduced into the urethra, and advanced into the bladder under direct vision. The urethral mucosa appeared normal, and no strictures were noted. The sphincter was normal, and the veru montanum was unremarkable. The prostatic mucosa and the lateral lobes of the prostate were unremarkable, with bilobar hypertrophy and complete visual obstruction. The bladder neck was normal. Inspection of the interior of the bladder was then carried out. The trigone was unremarkable, with no mucosal lesions. The ureteral orifices were normal in position and configuration. Systematic inspection of the mucosa of the bladder it was then carried out, rotating the cystoscope so that all areas of the left and right lateral walls, the dome of the bladder, and the posterior wall were all visualized. The cystoscope was then advanced further into the bladder, and maximum deflection of the scope was performed so that the bladder neck could be inspected. No mucosal lesions were noted there. The cystoscope was then removed, and the procedure terminated. Patient tolerated the procedure well. No complications.     Findings:  Bilobar hypertrophy, no tumors, no urethral stricture    Disposition:  - continue Flomax due to improvement in his urinary stream, patient was also noted decreased ejaculatory dysfunction on Flomax versus terazosin  - follow-up six months    Marcus Goldberg MD

## 2023-03-03 DIAGNOSIS — R39.12 BENIGN PROSTATIC HYPERPLASIA WITH WEAK URINARY STREAM: ICD-10-CM

## 2023-03-03 DIAGNOSIS — N40.1 BENIGN PROSTATIC HYPERPLASIA WITH WEAK URINARY STREAM: ICD-10-CM

## 2023-03-03 NOTE — TELEPHONE ENCOUNTER
Message sent to provider for a refill.       ----- Message from Chely Smith sent at 3/3/2023  3:18 PM CST -----  Contact: melquiades Villalobos is requesting a call to check status of Flomax 4 mg that was supposed to be sent to [facility] pharmacy. Please call him back at [phone] or [phone].                Thanks  DD

## 2023-03-08 RX ORDER — TAMSULOSIN HYDROCHLORIDE 0.4 MG/1
0.4 CAPSULE ORAL DAILY
Qty: 90 CAPSULE | Refills: 3 | Status: SHIPPED | OUTPATIENT
Start: 2023-03-08 | End: 2024-03-14

## 2023-04-17 ENCOUNTER — OFFICE VISIT (OUTPATIENT)
Dept: PRIMARY CARE CLINIC | Facility: CLINIC | Age: 78
End: 2023-04-17
Payer: MEDICARE

## 2023-04-17 ENCOUNTER — PATIENT MESSAGE (OUTPATIENT)
Dept: PRIMARY CARE CLINIC | Facility: CLINIC | Age: 78
End: 2023-04-17

## 2023-04-17 VITALS
DIASTOLIC BLOOD PRESSURE: 68 MMHG | SYSTOLIC BLOOD PRESSURE: 192 MMHG | TEMPERATURE: 98 F | WEIGHT: 241.13 LBS | OXYGEN SATURATION: 95 % | HEIGHT: 71 IN | BODY MASS INDEX: 33.76 KG/M2 | HEART RATE: 58 BPM

## 2023-04-17 DIAGNOSIS — I10 ESSENTIAL HYPERTENSION: Primary | ICD-10-CM

## 2023-04-17 DIAGNOSIS — K43.9 VENTRAL HERNIA WITHOUT OBSTRUCTION OR GANGRENE: Chronic | ICD-10-CM

## 2023-04-17 DIAGNOSIS — I48.0 PAROXYSMAL ATRIAL FIBRILLATION: ICD-10-CM

## 2023-04-17 DIAGNOSIS — E11.638 TYPE 2 DIABETES MELLITUS WITH OTHER ORAL COMPLICATION, WITHOUT LONG-TERM CURRENT USE OF INSULIN: ICD-10-CM

## 2023-04-17 DIAGNOSIS — Q01.8 TEMPORAL ENCEPHALOCELE: ICD-10-CM

## 2023-04-17 DIAGNOSIS — E87.6 HYPOKALEMIA: ICD-10-CM

## 2023-04-17 LAB
ALBUMIN SERPL BCP-MCNC: 3.6 G/DL (ref 3.5–5.2)
ANION GAP SERPL CALC-SCNC: 13 MMOL/L (ref 8–16)
BUN SERPL-MCNC: 14 MG/DL (ref 8–23)
CALCIUM SERPL-MCNC: 8.8 MG/DL (ref 8.7–10.5)
CHLORIDE SERPL-SCNC: 103 MMOL/L (ref 95–110)
CO2 SERPL-SCNC: 25 MMOL/L (ref 23–29)
CREAT SERPL-MCNC: 1.1 MG/DL (ref 0.5–1.4)
EST. GFR  (NO RACE VARIABLE): >60 ML/MIN/1.73 M^2
ESTIMATED AVG GLUCOSE: 146 MG/DL (ref 68–131)
GLUCOSE SERPL-MCNC: 205 MG/DL (ref 70–110)
HBA1C MFR BLD: 6.7 % (ref 4–5.6)
MAGNESIUM SERPL-MCNC: 1.7 MG/DL (ref 1.6–2.6)
PHOSPHATE SERPL-MCNC: 1.7 MG/DL (ref 2.7–4.5)
POTASSIUM SERPL-SCNC: 3.5 MMOL/L (ref 3.5–5.1)
SODIUM SERPL-SCNC: 141 MMOL/L (ref 136–145)

## 2023-04-17 PROCEDURE — 99215 PR OFFICE/OUTPT VISIT, EST, LEVL V, 40-54 MIN: ICD-10-PCS | Mod: S$GLB,,, | Performed by: INTERNAL MEDICINE

## 2023-04-17 PROCEDURE — 3288F FALL RISK ASSESSMENT DOCD: CPT | Mod: CPTII,S$GLB,, | Performed by: INTERNAL MEDICINE

## 2023-04-17 PROCEDURE — 1160F RVW MEDS BY RX/DR IN RCRD: CPT | Mod: CPTII,S$GLB,, | Performed by: INTERNAL MEDICINE

## 2023-04-17 PROCEDURE — 1159F MED LIST DOCD IN RCRD: CPT | Mod: CPTII,S$GLB,, | Performed by: INTERNAL MEDICINE

## 2023-04-17 PROCEDURE — 1101F PR PT FALLS ASSESS DOC 0-1 FALLS W/OUT INJ PAST YR: ICD-10-PCS | Mod: CPTII,S$GLB,, | Performed by: INTERNAL MEDICINE

## 2023-04-17 PROCEDURE — 83036 HEMOGLOBIN GLYCOSYLATED A1C: CPT | Performed by: INTERNAL MEDICINE

## 2023-04-17 PROCEDURE — 99417 PROLNG OP E/M EACH 15 MIN: CPT | Mod: S$GLB,,, | Performed by: INTERNAL MEDICINE

## 2023-04-17 PROCEDURE — 1126F PR PAIN SEVERITY QUANTIFIED, NO PAIN PRESENT: ICD-10-PCS | Mod: CPTII,S$GLB,, | Performed by: INTERNAL MEDICINE

## 2023-04-17 PROCEDURE — 99999 PR PBB SHADOW E&M-EST. PATIENT-LVL V: CPT | Mod: PBBFAC,,, | Performed by: INTERNAL MEDICINE

## 2023-04-17 PROCEDURE — 1126F AMNT PAIN NOTED NONE PRSNT: CPT | Mod: CPTII,S$GLB,, | Performed by: INTERNAL MEDICINE

## 2023-04-17 PROCEDURE — 3078F DIAST BP <80 MM HG: CPT | Mod: CPTII,S$GLB,, | Performed by: INTERNAL MEDICINE

## 2023-04-17 PROCEDURE — 80069 RENAL FUNCTION PANEL: CPT | Performed by: INTERNAL MEDICINE

## 2023-04-17 PROCEDURE — 1101F PT FALLS ASSESS-DOCD LE1/YR: CPT | Mod: CPTII,S$GLB,, | Performed by: INTERNAL MEDICINE

## 2023-04-17 PROCEDURE — 99999 PR PBB SHADOW E&M-EST. PATIENT-LVL V: ICD-10-PCS | Mod: PBBFAC,,, | Performed by: INTERNAL MEDICINE

## 2023-04-17 PROCEDURE — 1159F PR MEDICATION LIST DOCUMENTED IN MEDICAL RECORD: ICD-10-PCS | Mod: CPTII,S$GLB,, | Performed by: INTERNAL MEDICINE

## 2023-04-17 PROCEDURE — 1160F PR REVIEW ALL MEDS BY PRESCRIBER/CLIN PHARMACIST DOCUMENTED: ICD-10-PCS | Mod: CPTII,S$GLB,, | Performed by: INTERNAL MEDICINE

## 2023-04-17 PROCEDURE — 99215 OFFICE O/P EST HI 40 MIN: CPT | Mod: S$GLB,,, | Performed by: INTERNAL MEDICINE

## 2023-04-17 PROCEDURE — 3288F PR FALLS RISK ASSESSMENT DOCUMENTED: ICD-10-PCS | Mod: CPTII,S$GLB,, | Performed by: INTERNAL MEDICINE

## 2023-04-17 PROCEDURE — 3077F SYST BP >= 140 MM HG: CPT | Mod: CPTII,S$GLB,, | Performed by: INTERNAL MEDICINE

## 2023-04-17 PROCEDURE — 3077F PR MOST RECENT SYSTOLIC BLOOD PRESSURE >= 140 MM HG: ICD-10-PCS | Mod: CPTII,S$GLB,, | Performed by: INTERNAL MEDICINE

## 2023-04-17 PROCEDURE — 99417 PR PROLONGED SVC, OUTPT, W/WO DIRECT PT CONTACT,  EA ADDTL 15 MIN: ICD-10-PCS | Mod: S$GLB,,, | Performed by: INTERNAL MEDICINE

## 2023-04-17 PROCEDURE — 3078F PR MOST RECENT DIASTOLIC BLOOD PRESSURE < 80 MM HG: ICD-10-PCS | Mod: CPTII,S$GLB,, | Performed by: INTERNAL MEDICINE

## 2023-04-17 PROCEDURE — 83735 ASSAY OF MAGNESIUM: CPT | Performed by: INTERNAL MEDICINE

## 2023-04-17 NOTE — PROGRESS NOTES
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 Freddie Palacio 77 year old male here for scheduled f/u     Has not yet followed up for AWV  Has not yet followed up w Gen Surg regarding ventral hernia so hasn't been back to PT ([facility]?)    Incidental finding on recent head imaging supports suspected leakage of CSF into L middle ear - they are reluctant to extract sample of fluid through L TM however as could increase risk of meningitis (Also has hearing loss on R)   Dr. Ann @ [facility] says could repair surgically w NS and otologist but   increased risk given Mr. Palacio's age and other chronic medical conditions.  There is an increased risk of meningitis whether or not they proceed w surgery.  Prescribed augmentin and advised to take if develop L ear infection    They met w NS @ [facility] today who conveyed basically the same information  They plan to next f/u w ENT to help guide decision on best next steps    Mr. Palacio w increased anxiety w this new diagnosis and dilemma.     Mr. Palacio reportedly w diaphoresis/nightsweats often around 4-5 pm and 2-3 am.  Also reports often very drowsy after eating.  He does not restrict intake of sugars or simple carbs.  Current diabetes medication glyburide/metformin  Does not check blood glucose at home but does have glucometer.    Other Medical issues include PAF w Long term (current) use of anticoagulants;   Non-rheumatic aortic regurgitation;   Non-rheumatic mitral regurgitation;   Essential hypertension;   Dyslipidemia (low HDL)  Obstructive sleep apnea (difficulty w CPAP)  Diverticulosis w frequent diverticulitis flares   History  of colon polyps;   Gastroesophageal reflux disease without esophagitis;    Bell's palsy w cont Facial nerve spasticity  L Blepharospasm  Ptosis L eyelid  Nasal congestion  Urinary frequency/polyuria/nocturia (due to diuretics? excessive fluid intake? DAFNE?)  Disrupted sleep (due to anxiety, DAFNE, nocturia, nightsweats)    Recent appointments:   4/17/23 [facility]  3/21/23 Otolary Ann [facility]  2/10/23 Urology [facility] cystoscopy  12/07/22 Urology [facility]  12/05/22 [facility] large ventral hernia noted  12/05/22 [facility] cystitis/prostatitis  11/21/22 [facility] Smith establish care    Upcoming appointments:  Future Appointments       Date Provider Specialty Appt Notes    4/20/2023 Du Martines MD Surgery Ventral hernia without obstruction or gangrene     4/21/2023 Marcelo Carrasquillo MD Cardiology Paroxysmal atrial fibrillation     5/1/2023 Mirna Smith MD Primary Care 2 week f/u    8/11/2023 Marcus Goldberg MD Urology 6 month            The following were reviewed: Active problem list, medication list, allergies, family history, social history, and Health Maintenance.     Medications have been reviewed and reconciled with patient at visit today.    Review of Systems   Constitutional:  Positive for diaphoresis and fatigue. Negative for activity change, appetite change, fever and unexpected weight change.   HENT:  Positive for nasal congestion, hearing loss and rhinorrhea. Negative for ear pain.    Eyes:  Negative for pain.   Gastrointestinal:  Negative for abdominal pain, change in bowel habit and change in bowel habit.   Genitourinary:  Positive for frequency. Negative for dysuria and hematuria.        Nocturia   Musculoskeletal:  Negative for neck pain and neck stiffness.   Neurological:  Negative for seizures, syncope and headaches.   Psychiatric/Behavioral:  Positive for sleep disturbance. The patient is nervous/anxious.       Exam: Initial VS /83 P 58 sat 95% temp 97.5  Vitals:    04/17/23  1451   BP: (!) 192/68   Pulse:    Temp:      Weight: 109.4 kg (241 lb 1.6 oz)   Body mass index is 33.63 kg/m².    BP Readings from Last 3 Encounters:   04/17/23 (!) 192/68   02/10/23 (!) 141/76   12/07/22 136/70      Wt Readings from Last 3 Encounters:   04/17/23 1346 109.4 kg (241 lb 1.6 oz)   02/10/23 0936 108 kg (238 lb)   12/07/22 1044 108 kg (238 lb)      Physical Exam  Vitals reviewed.   Constitutional:       General: He is not in acute distress.     Appearance: Normal appearance. He is obese.   HENT:      Head: Normocephalic and atraumatic.      Right Ear: External ear normal.      Left Ear: External ear normal.      Nose: Nose normal.   Eyes:      Extraocular Movements: Extraocular movements intact.      Conjunctiva/sclera: Conjunctivae normal.   Cardiovascular:      Comments: Borderline bradycardia  Pulmonary:      Effort: Pulmonary effort is normal. No respiratory distress.   Abdominal:      General: There is no distension.      Hernia: A hernia is present.   Musculoskeletal:      Comments: Ambulates independently w/o AD   Skin:     General: Skin is warm and dry.   Neurological:      Mental Status: He is alert. Mental status is at baseline.   Psychiatric:         Behavior: Behavior normal.         Thought Content: Thought content normal.      Laboratory Reviewed  Lab Results   Component Value Date    WBC 5.87 01/13/2015    HGB 14.7 01/13/2015    HCT 44.5 01/13/2015     01/13/2015    MCV 91 01/13/2015    ALT 36 01/13/2015    AST 21 01/13/2015     01/13/2015    K 3.4 (L) 01/13/2015     01/13/2015    CREATININE 1.1 01/13/2015    BUN 17 01/13/2015    CO2 32 (H) 01/13/2015    TSH 1.835 01/13/2015    PSA 2.2 11/21/2022    HGBA1C 6.3 (H) 11/21/2022    CRP 5.4 01/13/2015     Outside labs  4/13/23 chol 131  HDL 37 LDL 94 BMP glucose 171 K+ 3.0 ca 8.5 crt 1.2 eGFR 58.6 CBC H/H 13.2/41.2 plt 156    Outside Imaging  MRI IAC 3/14/23:  There is indistinct along small areas of the right tegmen  mastoideum which may reflect meningoceles. No evidence of encephalocele.   2.  Right mastoid and middle ear effusions.   3.  No evidence of retrocochlear pathology.    CT IAC 3/08/23: 1.Chronic appearing right otitis media and mastoid effusion. 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 MRI IAC recommended for correlation but suspect encephalocele as well.   2. Diffuse thinning of the left tegmen with areas of dehiscence, but no mastoid effusion and middle ear effusion.  ------------------------------------------------------------------------------------------------------------  Outside Consultants        Neuro Surg Dr. Erin Gant [facility] 4/17/23  Assessment Note   MRI IAC 3/14/23, CT IAC 3/8/23: R mastoid effusion and dehiscence, L tegmen deshicence     Bilateral Tegmen Dehiscence, R Mastoid Effusion   tinnitus on the right side   R > L hearing loss   intermittent right sided ear fullness that improves with autoinsufflation.   vertigo   Denies any ear drainage or nausea   Denies any leaking from his ear   Dr Ann has noted CSF behind the L tympanic membrane   Considered the CSF leak, he has recommended a L MF Repair   I discussed the proposed surgery with the patient. The procedure was described in detail including typical recovery and restrictions. Risks and benefits of the procedure was also discussed. All questions were answered.   F/U prn    Assessment and Plan  1. Temporal encephalocele   --------------------------------------------------------------------------------------------------------    Reynaldo Wagner MD - 04/06/2023 5:24 PM YUNIEL MIKY  Case discussed at Neuroradiology Conference on Wednesday 4/5/23  On review of MRI and CT, there is extensive tegmen dehiscence over mastoid and epitympanum on the right side along with chronic effusion. There is concern for possible right temporal encephalocele and CSF leak. Semicircular canals intact.      --------------------------------------------------------------------------------------------------------  Andrea Ann MD - 03/21/2023 9:30 AM CDT    LEFT MCF ENCEPHALOCELE REPAIR  REFER TO NEUROSURGERY DR TERRELL CAGE    CT AND MRI REVIEWED  LEFT ENCEPHALOCELE WITH CSF IN MIDDLE EAR AND MASTOID - PULSATING TM    OPTIONS OF OBSERVATION AND SURGERY DISCUSSED  RISKS AND COMPLICATIONS OF BOTH OPTIONS DISCUSSED  Electronically signed by Andrea Ann MD at 03/21/2023 10:50 AM CDT    -----------------------------------------------------------------------------------------------------------  -----------------------------------------------------------------------------------------------------------  Outside Cardiology Studies   [facility] Myocardial Perfusion Stress 8/25/22 not available     Echocardiogram 8/05/22   Left ventricle is mildly dilated with moderate concentric hypertrophy.   Normal left ventricular systolic function with EF of 55-60%.   Normal left ventricular diastolic function.  Right ventricle is normal in size with normal systolic function.   There is moderate aortic regurgitation with eccentrically directed jet.   There is moderate mitral regurgitation.   The sinuses of Valsalva are mildly dilated at 3.8 cm.   Proximal ascending aorta is mildly dilated.     Assessment:   77 y.o. male with multiple co-morbid illnesses here for continued follow up of medical problems.      The primary encounter diagnosis was Essential hypertension. Diagnoses of Hypokalemia, Type 2 diabetes mellitus with other oral complication, without long-term current use of insulin, Ventral hernia without obstruction or gangrene, Paroxysmal atrial fibrillation, and Temporal encephalocele were also pertinent to this visit.      Plan:   1. Essential hypertension  Assessment & Plan:  Significantly elevated BP here today - f/u on home BP's - f/u in clinic in 2 weeks    Orders:  -     RENAL FUNCTION PANEL; Future;  Expected date: 04/17/2023    2. Hypokalemia  Assessment & Plan:  Does not take potassium supplement consistently - recheck K+, mag - consider referral to [facility]    Orders:  -     Magnesium; Future; Expected date: 04/17/2023    3. Type 2 diabetes mellitus with other oral complication, without long-term current use of insulin  Assessment & Plan:  Advised to start checking BS at home - would like to work on transitioning to alternate diabetes regimen - interested in CGM - advised to consider referral to Diabetes Management    Orders:  -     Hemoglobin A1C; Future; Expected date: 04/17/2023    4. Ventral hernia without obstruction or gangrene  Assessment & Plan:  PT requesting clearance from Gen surgery before proceeding w therapy for balance that will entail moving from lying to sitting    Orders:  -     Ambulatory referral/consult to General Surgery; Future; Expected date: 04/24/2023    5. Paroxysmal atrial fibrillation  Assessment & Plan:  Rate controlled - cont current Rx - wishes to establish w [facility] cardiologist if unable to reconnect w current cardiologist who is leaving [facility]    Orders:  -     Ambulatory referral/consult to Cardiology; Future; Expected date: 04/24/2023    6. Temporal encephalocele  Overview:  Neuro Surg Dr. Erin Gant [facility] 4/17/23  Assessment Note   MRI IAC 3/14/23, CT IAC 3/8/23: R mastoid effusion and dehiscence, L tegmen deshicence     Bilateral Tegmen Dehiscence, R Mastoid Effusion   tinnitus on the right side   R > L hearing loss   intermittent right sided ear fullness that improves with autoinsufflation.   vertigo   Denies any ear drainage or nausea   Denies any leaking from his ear   Dr Ann has noted CSF behind the L tympanic membrane   Considered the CSF leak, he has recommended a L MF Repair   I discussed the proposed surgery with the patient. The procedure was described in detail including typical recovery and restrictions. Risks and benefits of the procedure was also  discussed. All questions were answered.   F/U prn    Assessment and Plan  1. Temporal encephalocele   --------------------------------------------------------------------------------------------------------    Reynaldo Wagner MD - 04/06/2023 5:24 PM CDT MIKY  Case discussed at Neuroradiology Conference on Wednesday 4/5/23  On review of MRI and CT, there is extensive tegmen dehiscence over mastoid and epitympanum on the right side along with chronic effusion. There is concern for possible right temporal encephalocele and CSF leak. Semicircular canals intact.   --------------------------------------------------------------------------------------------------------  Andrea Ann MD - 03/21/2023 9:30 AM CDT    Formatting of this note might be different from the original.  LEFT MCF ENCEPHALOCELE REPAIR  REFER TO NEUROSURGERY DR TERRELL CAGE    CT AND MRI REVIEWED  LEFT ENCEPHALOCELE WITH CSF IN MIDDLE EAR AND MASTOID - PULSATING TM    OPTIONS OF OBSERVATION AND SURGERY DISCUSSED  RISKS AND COMPLICATIONS OF BOTH OPTIONS DISCUSSED  Electronically signed by Andrea Ann MD at 03/21/2023 10:50 AM CDT    Assessment & Plan:  Clarify R vs L vs B temporal encephalocele?  Pt and family weighing options, deciding next steps in diagnoses and treatment      Other orders  -     blood sugar diagnostic Strp; 3 strips by Misc.(Non-Drug; Combo Route) route 3 (three) times daily as needed. Check fasting BS Qam and as needed for change in status  Dispense: 100 strip; Refill: 5       Health Maintenance         Date Due Completion Date    Hepatitis C Screening Never done ---    Lipid Panel Never done ---    Pneumococcal Vaccines (Age 65+) (1 - PCV) Never done ---    Eye Exam Never done ---    TETANUS VACCINE Never done ---    Shingles Vaccine (1 of 2) Never done ---    Influenza Vaccine (1) Never done ---    Hemoglobin A1c 05/21/2023 11/21/2022    Diabetes Urine Screening 11/21/2023 11/21/2022          -Patient's lab results were  reviewed and discussed with patient  -Treatment options and alternatives were discussed with the patient. Patient expressed understanding. Patient was given the opportunity to ask questions and be an active participant in their medical care. Patient had no further questions or concerns at this time.   familywithpt: -Documentation of patient's health and condition was obtained from family member who was present during visit.   -Patient is an overall moderate to HIGH risk for health complications from their medical conditions.     Follow up: Follow up in about 2 weeks (around 5/1/2023) for Follow Up.    After visit summary printed and given to patient upon discharge.  Patient care plan included in After visit summary.    TOTAL TIME evaluating and managing this patient for this encounter was greater than 80 minutes. This time was spent personally by me on some of the following activities: review of patient's past medical history, assessing age-appropriate health maintenance needs, review of any interval history, review and interpretation of lab results, review and interpretation of imaging test results, review and interpretation of cardiology test results, reviewing consulting specialist notes, obtaining history from the patient and family, examination of the patient, medication reconciliation, managing and/or ordering prescription medications, ordering imaging tests, ordering referral to subspecialty provider(s), educating patient and answering their questions about diagnosis, treatment plan, and goals of treatment, discussing planned follow-up and final documentation of the visit. This time was exclusive of any separately billable procedures for this patient and exclusive of time spent treating any other patients.

## 2023-04-17 NOTE — PATIENT INSTRUCTIONS
"Let us know if interested in meeting w [facility] LCSW Aviva Lutz for talk therapy around anxiety, new medical diagnosis etc    Let us know if interested in adding medication to help w anxiety    Let us know if interested in referral to diabetes management     Please let us know if need new strips for glucometer (and name of glucometer)    Please check blood sugars every am and when feeling "off"   Please let us know how home Bss are running      "

## 2023-04-18 ENCOUNTER — PATIENT MESSAGE (OUTPATIENT)
Dept: PRIMARY CARE CLINIC | Facility: CLINIC | Age: 78
End: 2023-04-18
Payer: MEDICARE

## 2023-04-18 ENCOUNTER — TELEPHONE (OUTPATIENT)
Dept: PRIMARY CARE CLINIC | Facility: CLINIC | Age: 78
End: 2023-04-18
Payer: MEDICARE

## 2023-04-18 NOTE — ASSESSMENT & PLAN NOTE
Does not take potassium supplement consistently - recheck K+, mag - consider referral to [facility]

## 2023-04-18 NOTE — ASSESSMENT & PLAN NOTE
Clarify R vs L vs B temporal encephalocele?  Pt and family weighing options, deciding next steps in diagnoses and treatment

## 2023-04-18 NOTE — ASSESSMENT & PLAN NOTE
PT requesting clearance from Gen surgery before proceeding w therapy for balance that will entail moving from lying to sitting

## 2023-04-18 NOTE — ASSESSMENT & PLAN NOTE
Advised to start checking BS at home - would like to work on transitioning to alternate diabetes regimen - interested in CGM - advised to consider referral to Diabetes Management

## 2023-04-18 NOTE — ASSESSMENT & PLAN NOTE
Rate controlled - cont current Rx - wishes to establish w [facility] cardiologist if unable to reconnect w current cardiologist who is leaving [facility]

## 2023-04-20 ENCOUNTER — OFFICE VISIT (OUTPATIENT)
Dept: SURGERY | Facility: CLINIC | Age: 78
End: 2023-04-20
Payer: MEDICARE

## 2023-04-20 VITALS
WEIGHT: 239 LBS | DIASTOLIC BLOOD PRESSURE: 73 MMHG | HEART RATE: 56 BPM | BODY MASS INDEX: 33.46 KG/M2 | TEMPERATURE: 98 F | SYSTOLIC BLOOD PRESSURE: 171 MMHG | HEIGHT: 71 IN

## 2023-04-20 DIAGNOSIS — Z76.89 ESTABLISHING CARE WITH NEW DOCTOR, ENCOUNTER FOR: Primary | ICD-10-CM

## 2023-04-20 DIAGNOSIS — K43.9 VENTRAL HERNIA WITHOUT OBSTRUCTION OR GANGRENE: Chronic | ICD-10-CM

## 2023-04-20 DIAGNOSIS — I10 ESSENTIAL HYPERTENSION: Primary | Chronic | ICD-10-CM

## 2023-04-20 DIAGNOSIS — E11.638 TYPE 2 DIABETES MELLITUS WITH OTHER ORAL COMPLICATION, WITHOUT LONG-TERM CURRENT USE OF INSULIN: Chronic | ICD-10-CM

## 2023-04-20 DIAGNOSIS — G47.33 OBSTRUCTIVE SLEEP APNEA: Chronic | ICD-10-CM

## 2023-04-20 PROCEDURE — 3078F DIAST BP <80 MM HG: CPT | Mod: CPTII,S$GLB,, | Performed by: SURGERY

## 2023-04-20 PROCEDURE — 3078F PR MOST RECENT DIASTOLIC BLOOD PRESSURE < 80 MM HG: ICD-10-PCS | Mod: CPTII,S$GLB,, | Performed by: SURGERY

## 2023-04-20 PROCEDURE — 99203 PR OFFICE/OUTPT VISIT, NEW, LEVL III, 30-44 MIN: ICD-10-PCS | Mod: S$GLB,,, | Performed by: SURGERY

## 2023-04-20 PROCEDURE — 1126F AMNT PAIN NOTED NONE PRSNT: CPT | Mod: CPTII,S$GLB,, | Performed by: SURGERY

## 2023-04-20 PROCEDURE — 1160F PR REVIEW ALL MEDS BY PRESCRIBER/CLIN PHARMACIST DOCUMENTED: ICD-10-PCS | Mod: CPTII,S$GLB,, | Performed by: SURGERY

## 2023-04-20 PROCEDURE — 1159F MED LIST DOCD IN RCRD: CPT | Mod: CPTII,S$GLB,, | Performed by: SURGERY

## 2023-04-20 PROCEDURE — 99999 PR PBB SHADOW E&M-EST. PATIENT-LVL IV: CPT | Mod: PBBFAC,,, | Performed by: SURGERY

## 2023-04-20 PROCEDURE — 1126F PR PAIN SEVERITY QUANTIFIED, NO PAIN PRESENT: ICD-10-PCS | Mod: CPTII,S$GLB,, | Performed by: SURGERY

## 2023-04-20 PROCEDURE — 3077F SYST BP >= 140 MM HG: CPT | Mod: CPTII,S$GLB,, | Performed by: SURGERY

## 2023-04-20 PROCEDURE — 99203 OFFICE O/P NEW LOW 30 MIN: CPT | Mod: S$GLB,,, | Performed by: SURGERY

## 2023-04-20 PROCEDURE — 1159F PR MEDICATION LIST DOCUMENTED IN MEDICAL RECORD: ICD-10-PCS | Mod: CPTII,S$GLB,, | Performed by: SURGERY

## 2023-04-20 PROCEDURE — 3077F PR MOST RECENT SYSTOLIC BLOOD PRESSURE >= 140 MM HG: ICD-10-PCS | Mod: CPTII,S$GLB,, | Performed by: SURGERY

## 2023-04-20 PROCEDURE — 1160F RVW MEDS BY RX/DR IN RCRD: CPT | Mod: CPTII,S$GLB,, | Performed by: SURGERY

## 2023-04-20 PROCEDURE — 99999 PR PBB SHADOW E&M-EST. PATIENT-LVL IV: ICD-10-PCS | Mod: PBBFAC,,, | Performed by: SURGERY

## 2023-04-27 NOTE — PROGRESS NOTES
History & Physical    SUBJECTIVE:     History of Present Illness:  Patient is a 77 y.o. male referred for ventral hernia.  Patient reports abdominal wall bulge in the upper abdomen.  Denies any pain or discomfort.  It protrudes more when he sits up it goes away when lying down.    Chief Complaint   Patient presents with    Hernia       Review of patient's allergies indicates:   Allergen Reactions    Sulfa (sulfonamide antibiotics) Itching    Sulfamethoxazole-trimethoprim Itching and Rash    (d)-limonene flavor     Cipro [ciprofloxacin hcl]      Per cardiologist: Not something patient should be prescribed in combo with one of his heart medications.        Current Outpatient Medications   Medication Sig Dispense Refill    amiodarone (PACERONE) 200 MG Tab Take 200 mg by mouth once daily.      amlodipine (NORVASC) 10 MG tablet Take 5 mg by mouth 2 (two) times daily.      apixaban (ELIQUIS) 5 mg Tab Take 5 mg by mouth 2 (two) times daily.      blood sugar diagnostic Strp 3 strips by Misc.(Non-Drug; Combo Route) route 3 (three) times daily as needed. Check fasting BS Qam and as needed for change in status 100 strip 5    furosemide (LASIX) 20 MG tablet Take 20 mg by mouth.      glyBURIDE-metformin (GLUCOVANCE) 2.5-500 mg per tablet 2 (two) times daily with meals.      lisinopril-hydrochlorothiazide (PRINZIDE,ZESTORETIC) 20-25 mg Tab Take 1 tablet by mouth once daily.      metoprolol succinate (TOPROL-XL) 100 MG 24 hr tablet 100 mg once daily.      omeprazole (PRILOSEC) 40 MG capsule Take 40 mg by mouth.      potassium chloride (KLOR-CON) 10 MEQ TbSR Take 10 mEq by mouth.      sodium,potassium,mag sulfates (SUPREP BOWEL PREP KIT) 17.5-3.13-1.6 gram SolR Take one bottle on the evening prior to your exam at 6 PM and the other bottle on the morning of your exam 4 hours prior to your arrival.      tamsulosin (FLOMAX) 0.4 mg Cap Take 1 capsule (0.4 mg total) by mouth once daily. 90 capsule 3     No current facility-administered  medications for this visit.       Past Medical History:   Diagnosis Date    A-fib     Bell's palsy     Benign prostatic hyperplasia without lower urinary tract symptoms     CHF (congestive heart failure)     Diabetes mellitus, type 2     Diverticulitis     GERD (gastroesophageal reflux disease)     Obstructive sleep apnea     Phimosis     Seasonal allergies      Past Surgical History:   Procedure Laterality Date    CARDIOVERSION      CATARACT EXTRACTION Right     Both eyes    CHOLECYSTECTOMY       Family History   Problem Relation Age of Onset    Diverticulitis Mother     COPD Mother     Diabetes Father     Stomach cancer Father     Diverticulitis Sister     Colon cancer Brother     Diabetes Maternal Grandmother     Heart failure Paternal Grandfather      Social History     Tobacco Use    Smoking status: Never   Substance Use Topics    Alcohol use: No    Drug use: No        Review of Systems:  Review of Systems   Constitutional:  Negative for activity change, chills, fever and unexpected weight change.   HENT:  Positive for hearing loss. Negative for congestion, rhinorrhea and trouble swallowing.    Eyes:  Positive for discharge. Negative for visual disturbance.   Respiratory:  Negative for chest tightness, shortness of breath and wheezing.    Cardiovascular:  Negative for chest pain and palpitations.   Gastrointestinal:  Positive for diarrhea. Negative for abdominal distention, abdominal pain, blood in stool, constipation, nausea, rectal pain and vomiting.   Endocrine: Negative for polydipsia and polyuria.   Genitourinary:  Negative for difficulty urinating, dysuria, hematuria and urgency.   Musculoskeletal:  Negative for arthralgias, joint swelling and neck pain.   Skin:  Negative for rash.   Neurological:  Negative for weakness, light-headedness and headaches.   Hematological:  Negative for adenopathy.   Psychiatric/Behavioral:  Negative for confusion and dysphoric mood.      OBJECTIVE:     Vital Signs (Most  "Recent)  Temp: 97.7 °F (36.5 °C) (04/20/23 1233)  Pulse: (!) 56 (04/20/23 1233)  BP: (!) 171/73 (04/20/23 1233)  5' 11" (1.803 m)  108.4 kg (238 lb 15.7 oz)     Physical Exam:  Physical Exam  Vitals reviewed.   Constitutional:       Appearance: He is well-developed.   HENT:      Head: Normocephalic and atraumatic.   Cardiovascular:      Rate and Rhythm: Normal rate and regular rhythm.   Pulmonary:      Effort: Pulmonary effort is normal.      Breath sounds: Normal breath sounds.   Abdominal:      General: Bowel sounds are normal. There is no distension.      Palpations: Abdomen is soft.      Tenderness: There is no abdominal tenderness.      Hernia: No hernia is present.      Comments: Rectus diastasis of the epigastric region   Musculoskeletal:      Cervical back: Normal range of motion and neck supple.   Skin:     General: Skin is warm and dry.   Neurological:      Mental Status: He is alert and oriented to person, place, and time.         ASSESSMENT/PLAN:     77-year-old male with rectus diastasis    PLAN:Plan     No hernia on physical exam.  Previous outside CT reports reviewed with no med should of hernia.  Physical exam is consistent with rectus diastasis.  Discussed the etiology of rectus diastasis and provided reassurance.  Weight loss/core exercises   Follow-up p.r.n.         "

## 2023-05-01 ENCOUNTER — OFFICE VISIT (OUTPATIENT)
Dept: PRIMARY CARE CLINIC | Facility: CLINIC | Age: 78
End: 2023-05-01
Payer: MEDICARE

## 2023-05-01 VITALS
HEART RATE: 50 BPM | TEMPERATURE: 98 F | OXYGEN SATURATION: 95 % | SYSTOLIC BLOOD PRESSURE: 175 MMHG | HEIGHT: 71 IN | WEIGHT: 236.69 LBS | DIASTOLIC BLOOD PRESSURE: 76 MMHG | BODY MASS INDEX: 33.14 KG/M2

## 2023-05-01 DIAGNOSIS — N40.1 BENIGN PROSTATIC HYPERPLASIA WITH URINARY FREQUENCY: Chronic | ICD-10-CM

## 2023-05-01 DIAGNOSIS — E11.21 DIABETIC NEPHROPATHY ASSOCIATED WITH TYPE 2 DIABETES MELLITUS: Chronic | ICD-10-CM

## 2023-05-01 DIAGNOSIS — I10 ESSENTIAL HYPERTENSION: Primary | Chronic | ICD-10-CM

## 2023-05-01 DIAGNOSIS — I48.0 PAROXYSMAL ATRIAL FIBRILLATION: Chronic | ICD-10-CM

## 2023-05-01 DIAGNOSIS — R26.89 BALANCE DISORDER: ICD-10-CM

## 2023-05-01 DIAGNOSIS — Q01.8 TEMPORAL ENCEPHALOCELE: ICD-10-CM

## 2023-05-01 DIAGNOSIS — R35.0 BENIGN PROSTATIC HYPERPLASIA WITH URINARY FREQUENCY: Chronic | ICD-10-CM

## 2023-05-01 DIAGNOSIS — M62.08 DIASTASIS OF RECTUS ABDOMINIS: Chronic | ICD-10-CM

## 2023-05-01 PROCEDURE — 1125F AMNT PAIN NOTED PAIN PRSNT: CPT | Mod: CPTII,S$GLB,, | Performed by: INTERNAL MEDICINE

## 2023-05-01 PROCEDURE — 1101F PR PT FALLS ASSESS DOC 0-1 FALLS W/OUT INJ PAST YR: ICD-10-PCS | Mod: CPTII,S$GLB,, | Performed by: INTERNAL MEDICINE

## 2023-05-01 PROCEDURE — 99999 PR PBB SHADOW E&M-EST. PATIENT-LVL IV: ICD-10-PCS | Mod: PBBFAC,,, | Performed by: INTERNAL MEDICINE

## 2023-05-01 PROCEDURE — 3288F PR FALLS RISK ASSESSMENT DOCUMENTED: ICD-10-PCS | Mod: CPTII,S$GLB,, | Performed by: INTERNAL MEDICINE

## 2023-05-01 PROCEDURE — 1101F PT FALLS ASSESS-DOCD LE1/YR: CPT | Mod: CPTII,S$GLB,, | Performed by: INTERNAL MEDICINE

## 2023-05-01 PROCEDURE — 3077F PR MOST RECENT SYSTOLIC BLOOD PRESSURE >= 140 MM HG: ICD-10-PCS | Mod: CPTII,S$GLB,, | Performed by: INTERNAL MEDICINE

## 2023-05-01 PROCEDURE — 99215 OFFICE O/P EST HI 40 MIN: CPT | Mod: S$GLB,,, | Performed by: INTERNAL MEDICINE

## 2023-05-01 PROCEDURE — 3078F PR MOST RECENT DIASTOLIC BLOOD PRESSURE < 80 MM HG: ICD-10-PCS | Mod: CPTII,S$GLB,, | Performed by: INTERNAL MEDICINE

## 2023-05-01 PROCEDURE — 1159F PR MEDICATION LIST DOCUMENTED IN MEDICAL RECORD: ICD-10-PCS | Mod: CPTII,S$GLB,, | Performed by: INTERNAL MEDICINE

## 2023-05-01 PROCEDURE — 1125F PR PAIN SEVERITY QUANTIFIED, PAIN PRESENT: ICD-10-PCS | Mod: CPTII,S$GLB,, | Performed by: INTERNAL MEDICINE

## 2023-05-01 PROCEDURE — 1159F MED LIST DOCD IN RCRD: CPT | Mod: CPTII,S$GLB,, | Performed by: INTERNAL MEDICINE

## 2023-05-01 PROCEDURE — 99215 PR OFFICE/OUTPT VISIT, EST, LEVL V, 40-54 MIN: ICD-10-PCS | Mod: S$GLB,,, | Performed by: INTERNAL MEDICINE

## 2023-05-01 PROCEDURE — 3078F DIAST BP <80 MM HG: CPT | Mod: CPTII,S$GLB,, | Performed by: INTERNAL MEDICINE

## 2023-05-01 PROCEDURE — 99999 PR PBB SHADOW E&M-EST. PATIENT-LVL IV: CPT | Mod: PBBFAC,,, | Performed by: INTERNAL MEDICINE

## 2023-05-01 PROCEDURE — 3077F SYST BP >= 140 MM HG: CPT | Mod: CPTII,S$GLB,, | Performed by: INTERNAL MEDICINE

## 2023-05-01 PROCEDURE — 3288F FALL RISK ASSESSMENT DOCD: CPT | Mod: CPTII,S$GLB,, | Performed by: INTERNAL MEDICINE

## 2023-05-01 RX ORDER — METFORMIN HYDROCHLORIDE 500 MG/1
500 TABLET, EXTENDED RELEASE ORAL 2 TIMES DAILY WITH MEALS
Qty: 180 TABLET | Refills: 3 | Status: SHIPPED | OUTPATIENT
Start: 2023-05-01 | End: 2023-06-02

## 2023-05-01 RX ORDER — ORAL SEMAGLUTIDE 3 MG/1
3 TABLET ORAL DAILY
Qty: 30 TABLET | Refills: 1 | Status: SHIPPED | OUTPATIENT
Start: 2023-05-01 | End: 2023-06-02

## 2023-05-01 NOTE — PROGRESS NOTES
Freddie Palacio  05/01/2023  [id number]    Mirna Smith MD  Patient Care Team:  Mirna Smith MD as PCP - General (Internal Medicine)  Una Gar NP as Nurse Practitioner (Family Medicine)    Visit Type: Follow-up    Chief Complaint:  Chief Complaint   Patient presents with    2 week f/u      History of Present Illness: Mr. Freddie Palacio 77 year old male here for scheduled f/u. He is here with his long-time , Gerda Parker.     Clarification: Possible CSF leakage into RIGHT middle ear    Elevated BP's at home as well as here w low heart rate  Will be seeing new [facility] cardiologist Dr. Beltran on Thursday    Plan for virtual consult w specialist at [place] regarding best next steps regarding possible CSF leakage into RIGHT middle ear    Still w excessive nocturia starting around 2am   Daytime urinary frequency   C/o loose stools recently    Still w nightsweats every other night     Back pain off and on R mid thoracic    Seen by Gen Surg - no hernia - rectus diastasis - ok to participate in PT  Will hold off on PT for now though while sorting out next steps in evaluating for possible CSF leakage into R middle ear    Has not been checking BS at home    Other Medical issues include PAF w Long term (current) use of anticoagulants;   Non-rheumatic aortic regurgitation;   Non-rheumatic mitral regurgitation;   Dyslipidemia (low HDL)  Obstructive sleep apnea (difficulty w CPAP)  Diverticulosis w frequent diverticulitis flares   History of colon polyps;   Gastroesophageal reflux disease without esophagitis;    Bell's palsy w cont Facial nerve spasticity  L Blepharospasm  Ptosis L eyelid  Nasal congestion  Disrupted sleep (due to anxiety, DAFNE, nocturia, nightsweats)    Recent appointments:   4/20/23 Gen Surg Conrad rectus diastasis  No hernia on physical exam. Previous outside CT reports reviewed with no mention of hernia.  Physical exam is consistent with rectus diastasis.  Discussed the etiology of rectus  diastasis and provided reassurance.  Weight loss/core exercises Follow-up p.r.n  4/17/23 NS [facility]  3/21/23 Otolary Seth TEIXEIRAOL  2/10/23 Urology Yusuf cystoscopy  12/07/22 Urology Yusuf  12/05/22 O65+ PT [facility] possible ventral hernia noted  12/05/22 O65+ Smith cystitis/prostatitis  11/21/22 O65+ Smith establish care    Upcoming appointments:  Future Appointments       Date Provider Specialty Appt Notes    5/15/2023  Primary Care Nurse visit (B/P)    6/2/2023 Mirna Smith MD Primary Care 1 mth f/u     8/11/2023 Marcus Goldberg MD Urology 6 month            The following were reviewed: Active problem list, medication list, allergies, family history, social history, and Health Maintenance.     Medications have been reviewed and reconciled with patient at visit today.    Review of Systems   See HPI above    Exam: Initial VS /74 P 50 sat 95% Temp 97.6  Vitals:    05/01/23 1131   BP: (!) 175/76   Pulse: (!) 50   Temp:      Weight: 107.4 kg (236 lb 11.2 oz)   Body mass index is 33.01 kg/m².    BP Readings from Last 3 Encounters:   05/01/23 (!) 175/76   04/20/23 (!) 171/73   04/17/23 (!) 192/68      Wt Readings from Last 3 Encounters:   05/01/23 1035 107.4 kg (236 lb 11.2 oz)   04/20/23 1233 108.4 kg (238 lb 15.7 oz)   04/17/23 1346 109.4 kg (241 lb 1.6 oz)      Physical Exam  Vitals reviewed.   Constitutional:       General: He is not in acute distress.     Appearance: Normal appearance. He is obese. He is not ill-appearing.   HENT:      Head: Normocephalic and atraumatic.      Right Ear: External ear normal.      Left Ear: External ear normal.      Nose: Nose normal.   Eyes:      General: No scleral icterus.     Extraocular Movements: Extraocular movements intact.      Conjunctiva/sclera: Conjunctivae normal.   Cardiovascular:      Rate and Rhythm: Bradycardia present.   Pulmonary:      Effort: Pulmonary effort is normal. No respiratory distress.   Musculoskeletal:      Comments: C/o right  mid thoracic backpain  No abnormality noted on observation or palpation   Skin:     General: Skin is warm and dry.      Findings: No rash.   Neurological:      Mental Status: He is alert. Mental status is at baseline.      Coordination: Coordination normal.      Comments: Ambulates independently w/o AD   Psychiatric:         Behavior: Behavior normal.         Thought Content: Thought content normal.      Laboratory Reviewed  Lab Results   Component Value Date    WBC 5.87 01/13/2015    HGB 14.7 01/13/2015    HCT 44.5 01/13/2015     01/13/2015    MCV 91 01/13/2015    ALT 36 01/13/2015    AST 21 01/13/2015     04/17/2023    K 3.5 04/17/2023     04/17/2023    CREATININE 1.1 04/17/2023    BUN 14 04/17/2023    CO2 25 04/17/2023    MG 1.7 04/17/2023    TSH 1.835 01/13/2015    PSA 2.2 11/21/2022    HGBA1C 6.7 (H) 04/17/2023    CRP 5.4 01/13/2015     Outside labs  4/13/23 chol 131  HDL 37 LDL 94 BMP glucose 171 K+ 3.0 ca 8.5 crt 1.2 eGFR 58.6 CBC H/H 13.2/41.2 plt 156     Outside Imaging  MRI IAC 3/14/23:  There is indistinct along small areas of the right tegmen mastoideum which may reflect meningoceles. No evidence of encephalocele.   2.  Right mastoid and middle ear effusions.   3.  No evidence of retrocochlear pathology.     CT IAC 3/08/23: 1.Chronic appearing right otitis media and mastoid effusion. 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 MRI IAC recommended for correlation but suspect encephalocele as well.   2. Diffuse thinning of the left tegmen with areas of dehiscence, but no mastoid effusion and middle ear effusion.  ------------------------------------------------------------------     Reynaldo Wagner MD - 04/06/2023 5:24 PM YUNIEL BOLAÑOS  Case discussed at Neuroradiology Conference on Wednesday 4/5/23  On review of MRI and CT, there is extensive tegmen dehiscence over mastoid and epitympanum on the right side along with chronic effusion. There  is concern for possible right temporal encephalocele and CSF leak. Semicircular canals intact.      Outside Cardiology Studies   [facility]     NM Myocardial Perfusion Stress 8/25/22 not available      Echocardiogram 8/05/22   Left ventricle is mildly dilated with moderate concentric hypertrophy.   Normal left ventricular systolic function with EF of 55-60%.   Normal left ventricular diastolic function.  Right ventricle is normal in size with normal systolic function.   There is moderate aortic regurgitation with eccentrically directed jet.   There is moderate mitral regurgitation.   The sinuses of Valsalva are mildly dilated at 3.8 cm.   Proximal ascending aorta is mildly dilated.     Assessment:   77 y.o. male with multiple co-morbid illnesses here for continued follow up of medical problems.      The primary encounter diagnosis was Essential hypertension. Diagnoses of Benign prostatic hyperplasia with urinary frequency, Diabetic nephropathy associated with type 2 diabetes mellitus, Diastasis of rectus abdominis, Balance disorder, Temporal encephalocele, and Paroxysmal atrial fibrillation were also pertinent to this visit.      Plan:   1. Essential hypertension  Assessment & Plan:  F/u on home BP's - goal 130/80 if tolerates w/o orthostais but at minimum SBP under 160 - is he taking both HCTZ and furosemide daily? Consider discontinue one or the other - potassium and magnesium lowest end of normal - encourage increase potassium and magnesium rich items in diet      2. Benign prostatic hyperplasia with urinary frequency  Assessment & Plan:  Cont flomax - Recommend cont avoid terazosin - consider contact Dr. Goldberg, Urology, for earlier appt if available - make sure taking diuretic in am - limit fluid intake late afternoon/evening - avoid caffeine/alcohol in afternoon/evening - using CPAP?       3. Diabetic nephropathy associated with type 2 diabetes mellitus  Assessment & Plan:  In agreement memo  discontinuing glyburide, trial GLP1 agonist if covered by insurance - work on cutting back sugar and other simple carbs - advise to start checking BSs at home       4. Diastasis of rectus abdominis  Assessment & Plan:  Ok to participate in PT w some precautions to avoid excessive mid abd strain      5. Balance disorder  Assessment & Plan:  Suspect may be related to possible CSF drainage into R middle ear - has been evaluated by [facility] neuro-otologist, IAN SOLOMON and plan for virtual consult w specialist from [place] to determine best next steps      6. Temporal encephalocele  Overview:  Reynaldo Wagner MD - 04/06/2023 5:24 PM CDT [facility]  Case discussed at Neuroradiology Conference on Wednesday 4/5/23  On review of MRI and CT, there is extensive tegmen dehiscence over mastoid and epitympanum on the right side along with chronic effusion. There is concern for possible right temporal encephalocele and CSF leak. Semicircular canals intact.     Assessment & Plan:  Clarified issue on R not L  Has been evaluated by [facility] neuro-otologist, IAN SOLOMON and plan for virtual consult w specialist from [place] to determine best next steps        7. Paroxysmal atrial fibrillation  Assessment & Plan:  Plans to f/u w new cardiologist at [facility] on Thursday - note elevated BP's w borderline bradycardia       Other orders  -     metFORMIN (GLUCOPHAGE-XR) 500 MG ER 24hr tablet; Take 1 tablet (500 mg total) by mouth 2 (two) times daily with meals. Take w meal higher in protein lower in carb  Dispense: 180 tablet; Refill: 3  -     semaglutide (RYBELSUS) 3 mg tablet; Take 1 tablet (3 mg total) by mouth once daily.  Dispense: 30 tablet; Refill: 1         Health Maintenance         Date Due Completion Date    Hepatitis C Screening Never done ---    Lipid Panel Never done ---    Pneumococcal Vaccines (Age 65+) (1 - PCV) Never done ---    Eye Exam Never done ---    TETANUS VACCINE Never done ---    Shingles Vaccine (1 of 2) Never done ---    Influenza  Vaccine (Season Ended) 09/01/2023 ---    Hemoglobin A1c 10/17/2023 4/17/2023    Diabetes Urine Screening 11/21/2023 11/21/2022            -Patient's lab results were reviewed and discussed with patient  -Treatment options and alternatives were discussed with the patient. Patient expressed understanding. Patient was given the opportunity to ask questions and be an active participant in their medical care. Patient had no further questions or concerns at this time.   familywithpt: -Documentation of patient's health and condition was obtained from family member who was present during visit.   -Patient is an overall moderate to HIGH risk for health complications from their medical conditions.     Follow up: Follow up in about 1 month (around 6/1/2023) for Follow Up w me.    After visit summary printed and given to patient upon discharge.  Patient goals and care plan are included in After visit summary.    TOTAL TIME evaluating and managing this patient for this encounter was greater than 45 minutes. This time was spent personally by me on some of the following activities: review of patient's past medical history, assessing age-appropriate health maintenance needs, review of any interval history, review and interpretation of lab results, review and interpretation of imaging test results, review and interpretation of cardiology test results, reviewing consulting specialist notes, obtaining history from the patient and family, examination of the patient, medication reconciliation, managing and/or ordering prescription medications, ordering imaging tests, ordering referral to subspecialty provider(s), educating patient and answering their questions about diagnosis, treatment plan, and goals of treatment, discussing planned follow-up and final documentation of the visit. This time was exclusive of any separately billable procedures for this patient and exclusive of time spent treating any other patients.

## 2023-05-01 NOTE — PATIENT INSTRUCTIONS
Continue efforts to cut back on simple carbs and sugar  Continue efforts to cut back on sodium   Work on slow regular breathing through nose  Work on stress management    Some foods rich in magnesium include nuts, spinach, and other green leafy vegetables, and whole grains like oatmeal, wheat bran, or bran flakes. Dried beans, dried apricots, and avocados are also high in magnesium.     Some foods high in potassium include bananas, [place] nuts, broccoli, papaya, peanuts, potato skin, raisins, tomatoes, and sports drinks.     Check BS, BP and pulse when sweaty and shakey   Let us know how they are at that time    Consider earlier f/u w Urologist, Dr. Yusuf Lopez to take acetaminophen 650mg 3x daily or 500mg 4x daily as needed

## 2023-05-06 PROBLEM — R26.89 BALANCE DISORDER: Chronic | Status: ACTIVE | Noted: 2022-12-05

## 2023-05-06 PROBLEM — N40.0 BPH (BENIGN PROSTATIC HYPERPLASIA): Chronic | Status: ACTIVE | Noted: 2022-07-30

## 2023-05-06 PROBLEM — M62.08 DIASTASIS OF RECTUS ABDOMINIS: Chronic | Status: ACTIVE | Noted: 2022-12-11

## 2023-05-06 NOTE — ASSESSMENT & PLAN NOTE
Clarified issue on R not L  Has been evaluated by [facility] neuro-otologist, IAN SOLOMON and plan for virtual consult w specialist from [place] to determine best next steps

## 2023-05-06 NOTE — ASSESSMENT & PLAN NOTE
Cont flomax - Recommend cont avoid terazosin - consider contact Dr. Goldberg, Urology, for earlier appt if available - make sure taking diuretic in am - limit fluid intake late afternoon/evening - avoid caffeine/alcohol in afternoon/evening - using CPAP?

## 2023-05-06 NOTE — ASSESSMENT & PLAN NOTE
Suspect may be related to possible CSF drainage into R middle ear - has been evaluated by [facility] neuro-otologist, IAN SOLOMON and plan for virtual consult w specialist from [place] to determine best next steps

## 2023-05-06 NOTE — ASSESSMENT & PLAN NOTE
F/u on home BP's - goal 130/80 if tolerates w/o orthostais but at minimum SBP under 160 - is he taking both HCTZ and furosemide daily? Consider discontinue one or the other - potassium and magnesium lowest end of normal - encourage increase potassium and magnesium rich items in diet

## 2023-05-06 NOTE — ASSESSMENT & PLAN NOTE
In agreement w discontinuing glyburide, trial GLP1 agonist if covered by insurance - work on cutting back sugar and other simple carbs - advise to start checking BSs at home

## 2023-05-22 ENCOUNTER — TELEPHONE (OUTPATIENT)
Dept: PRIMARY CARE CLINIC | Facility: CLINIC | Age: 78
End: 2023-05-22
Payer: MEDICARE

## 2023-05-22 NOTE — TELEPHONE ENCOUNTER
----- Message from Mirna Smith MD sent at 5/6/2023  6:16 PM CDT -----  Regarding: BP - BS - cards - meds  Good morning - can you please send KAREEN to [facility] if not already done so we can get progress note from their visit last Thursday, 5/04?  Could you please call Mr. Palacio to see if he can tell you how his blood pressures and heart rate have been running and if [facility] cardiologist, Dr. Beltran, made any changes to his medications? If he can't tell you, is he ok w your speaking w his , Nicole Parker?   Has he started checking his blood sugars yet?  Was he able to get the Rybelsus and if so has he started taking it yet?

## 2023-05-22 NOTE — TELEPHONE ENCOUNTER
I called ans spoke with the patient daughter. She took down my questions and stated she will ask her dad and send us his responses through the portal. I also sent over the KAREEN to [facility] for Dr. Beltran to get his last progress notes from his 5/4/23 OV.

## 2023-05-26 ENCOUNTER — PATIENT MESSAGE (OUTPATIENT)
Dept: PRIMARY CARE CLINIC | Facility: CLINIC | Age: 78
End: 2023-05-26
Payer: MEDICARE

## 2023-06-02 ENCOUNTER — PATIENT MESSAGE (OUTPATIENT)
Dept: PRIMARY CARE CLINIC | Facility: CLINIC | Age: 78
End: 2023-06-02

## 2023-06-02 ENCOUNTER — OFFICE VISIT (OUTPATIENT)
Dept: PRIMARY CARE CLINIC | Facility: CLINIC | Age: 78
End: 2023-06-02
Payer: MEDICARE

## 2023-06-02 VITALS
WEIGHT: 239.31 LBS | HEIGHT: 71 IN | DIASTOLIC BLOOD PRESSURE: 62 MMHG | SYSTOLIC BLOOD PRESSURE: 142 MMHG | HEART RATE: 60 BPM | OXYGEN SATURATION: 95 % | TEMPERATURE: 98 F | BODY MASS INDEX: 33.5 KG/M2

## 2023-06-02 DIAGNOSIS — I10 ESSENTIAL HYPERTENSION: Chronic | ICD-10-CM

## 2023-06-02 DIAGNOSIS — I48.0 PAROXYSMAL ATRIAL FIBRILLATION: Chronic | ICD-10-CM

## 2023-06-02 DIAGNOSIS — M54.50 DORSALGIA OF LUMBAR REGION: ICD-10-CM

## 2023-06-02 DIAGNOSIS — E11.21 TYPE 2 DIABETES MELLITUS WITH DIABETIC NEPHROPATHY, WITHOUT LONG-TERM CURRENT USE OF INSULIN: Chronic | ICD-10-CM

## 2023-06-02 DIAGNOSIS — R26.89 BALANCE DISORDER: Primary | Chronic | ICD-10-CM

## 2023-06-02 DIAGNOSIS — Q01.8 TEMPORAL ENCEPHALOCELE: ICD-10-CM

## 2023-06-02 DIAGNOSIS — D69.2 SENILE PURPURA: ICD-10-CM

## 2023-06-02 DIAGNOSIS — I50.32 CHRONIC DIASTOLIC CONGESTIVE HEART FAILURE: Chronic | ICD-10-CM

## 2023-06-02 DIAGNOSIS — Z74.09 DECREASED MOBILITY AND ENDURANCE: Chronic | ICD-10-CM

## 2023-06-02 PROCEDURE — 3077F PR MOST RECENT SYSTOLIC BLOOD PRESSURE >= 140 MM HG: ICD-10-PCS | Mod: CPTII,S$GLB,, | Performed by: INTERNAL MEDICINE

## 2023-06-02 PROCEDURE — 1125F PR PAIN SEVERITY QUANTIFIED, PAIN PRESENT: ICD-10-PCS | Mod: CPTII,S$GLB,, | Performed by: INTERNAL MEDICINE

## 2023-06-02 PROCEDURE — 99999 PR PBB SHADOW E&M-EST. PATIENT-LVL V: ICD-10-PCS | Mod: PBBFAC,,, | Performed by: INTERNAL MEDICINE

## 2023-06-02 PROCEDURE — 1125F AMNT PAIN NOTED PAIN PRSNT: CPT | Mod: CPTII,S$GLB,, | Performed by: INTERNAL MEDICINE

## 2023-06-02 PROCEDURE — 99999 PR PBB SHADOW E&M-EST. PATIENT-LVL V: CPT | Mod: PBBFAC,,, | Performed by: INTERNAL MEDICINE

## 2023-06-02 PROCEDURE — 3078F PR MOST RECENT DIASTOLIC BLOOD PRESSURE < 80 MM HG: ICD-10-PCS | Mod: CPTII,S$GLB,, | Performed by: INTERNAL MEDICINE

## 2023-06-02 PROCEDURE — 1101F PR PT FALLS ASSESS DOC 0-1 FALLS W/OUT INJ PAST YR: ICD-10-PCS | Mod: CPTII,S$GLB,, | Performed by: INTERNAL MEDICINE

## 2023-06-02 PROCEDURE — 1101F PT FALLS ASSESS-DOCD LE1/YR: CPT | Mod: CPTII,S$GLB,, | Performed by: INTERNAL MEDICINE

## 2023-06-02 PROCEDURE — 1159F PR MEDICATION LIST DOCUMENTED IN MEDICAL RECORD: ICD-10-PCS | Mod: CPTII,S$GLB,, | Performed by: INTERNAL MEDICINE

## 2023-06-02 PROCEDURE — 99215 PR OFFICE/OUTPT VISIT, EST, LEVL V, 40-54 MIN: ICD-10-PCS | Mod: S$GLB,,, | Performed by: INTERNAL MEDICINE

## 2023-06-02 PROCEDURE — 3077F SYST BP >= 140 MM HG: CPT | Mod: CPTII,S$GLB,, | Performed by: INTERNAL MEDICINE

## 2023-06-02 PROCEDURE — 99215 OFFICE O/P EST HI 40 MIN: CPT | Mod: S$GLB,,, | Performed by: INTERNAL MEDICINE

## 2023-06-02 PROCEDURE — 3078F DIAST BP <80 MM HG: CPT | Mod: CPTII,S$GLB,, | Performed by: INTERNAL MEDICINE

## 2023-06-02 PROCEDURE — 3288F FALL RISK ASSESSMENT DOCD: CPT | Mod: CPTII,S$GLB,, | Performed by: INTERNAL MEDICINE

## 2023-06-02 PROCEDURE — 1159F MED LIST DOCD IN RCRD: CPT | Mod: CPTII,S$GLB,, | Performed by: INTERNAL MEDICINE

## 2023-06-02 PROCEDURE — 3288F PR FALLS RISK ASSESSMENT DOCUMENTED: ICD-10-PCS | Mod: CPTII,S$GLB,, | Performed by: INTERNAL MEDICINE

## 2023-06-02 RX ORDER — METFORMIN HYDROCHLORIDE 500 MG/1
500 TABLET, EXTENDED RELEASE ORAL
Qty: 90 TABLET | Refills: 3 | Status: SHIPPED | OUTPATIENT
Start: 2023-06-02 | End: 2024-06-01

## 2023-06-02 RX ORDER — GLYBURIDE-METFORMIN HYDROCHLORIDE 2.5; 5 MG/1; MG/1
1 TABLET ORAL
Qty: 90 TABLET | Refills: 0 | Status: SHIPPED | OUTPATIENT
Start: 2023-06-02 | End: 2023-08-31

## 2023-06-02 NOTE — PROGRESS NOTES
"Freddie Palacio  06/02/2023  [id number]    Mirna Smith MD  Patient Care Team:  Mirna Smith MD as PCP - General (Internal Medicine)  Una Gar NP as Nurse Practitioner (Family Medicine)    Visit Type: Follow-up    Chief Complaint:  Chief Complaint   Patient presents with    1 mth f/u      History of Present Illness:  Mr. Freddie Palacio 77 year old male here for scheduled f/u.    Reports still feels a bit off balance moving from sitting to standing or moving from head down to head up but the severe vertigo has resolved    Reports home SBP's elevated 150's - 170's HR 57-61  Reports home BS's 130's but hasn't been checking recently    Did not start the Rybelsus because co-pay would be $400 because he is "in the donut hole"     Has continued taking metformin-glyburide 500-2.5 BID  Still w occasional nightsweats but not so much during the day any longer    Takes furosemide every 3 days or so  Still w excessive urinary frequency but not as bad on days doesn't take furosemide    Evaluation in process for vertigo/imbalance w suspected CSF leakage into RIGHT middle ear (temporal encephalocele)  Other Medical issues include Obesity Type 2 Diabetes BPH HTN  PAF w Long term (current) use of anticoagulants;   Non-rheumatic aortic regurgitation;   Non-rheumatic mitral regurgitation;   Dyslipidemia (low HDL)  Obstructive sleep apnea (difficulty w CPAP)  Diverticulosis w frequent diverticulitis flares   History of colon polyps;   Gastroesophageal reflux disease without esophagitis;    Bell's palsy w cont Facial nerve spasticity  L Blepharospasm  Ptosis L eyelid  Nasal congestion  Disrupted sleep (due to anxiety, DAFNE, nocturia, nightsweats)    Recent appointments:   5/04/23 Cards Devin [facility]  5/01/23 [facility] Smith  4/27/23 Plastic Surgery Shari [facility] Synkinesis B Brow Ptosis B Dermatochalasis B Eyelid Ptosis  4/24/23 Otolaryn Ann [facility]  Plan for R Middle Fossa Encephalocele Repair  4/20/23 Gen Surg Conrad rectus " diastasis  4/17/23 O[facility]+ Luis  4/17/23 NS [facility]  3/21/23 Otolary Ann [facility]  2/10/23 Urology [place] cystoscopy    Did they have virtual consult w specialist at [facility] regarding R Middle Fossa Encephalocele?    Upcoming appointments:  Next Cardiology appt June 14th  Future Appointments       Date Provider Specialty Appt Notes    6/16/2023  Primary Care 2 wk BP Check    7/14/2023 Una Gar NP Primary Care awv    7/14/2023 Mirna Smith MD Primary Care 6 week f/u     8/11/2023 Marcus Goldberg MD Urology 6 month            The following were reviewed: Active problem list, medication list, allergies, family history, social history, and Health Maintenance.     Medications have been reviewed and reconciled with patient at visit today.    Review of Systems   See HPI above    Exam: VS /80 P 60 sat 95% temp 98  Vitals:    06/02/23 1412   BP: (!) 142/62   Pulse:    Temp:      Weight: 108.5 kg (239 lb 4.8 oz)   Body mass index is 33.38 kg/m².    BP Readings from Last 3 Encounters:   06/02/23 (!) 142/62   05/01/23 (!) 175/76   04/20/23 (!) 171/73      Wt Readings from Last 3 Encounters:   06/02/23 1309 108.5 kg (239 lb 4.8 oz)   05/01/23 1035 107.4 kg (236 lb 11.2 oz)   04/20/23 1233 108.4 kg (238 lb 15.7 oz)      Physical Exam  Vitals reviewed.   Constitutional:       General: He is not in acute distress.     Appearance: He is obese. He is not ill-appearing.   HENT:      Head: Normocephalic and atraumatic.      Right Ear: Tympanic membrane, ear canal and external ear normal.      Left Ear: Tympanic membrane, ear canal and external ear normal.      Nose: Nose normal.      Mouth/Throat:      Mouth: Mucous membranes are moist.      Pharynx: Oropharynx is clear.   Eyes:      General: No scleral icterus.     Extraocular Movements: Extraocular movements intact.      Conjunctiva/sclera: Conjunctivae normal.   Neck:      Vascular: No carotid bruit.   Cardiovascular:      Rate and Rhythm: Normal  rate and regular rhythm.      Heart sounds: No murmur heard.  Pulmonary:      Effort: Pulmonary effort is normal.      Breath sounds: Normal breath sounds. No wheezing, rhonchi or rales.   Abdominal:      General: Bowel sounds are normal.      Palpations: Abdomen is soft.      Tenderness: There is no abdominal tenderness. There is no guarding.   Musculoskeletal:      Right lower leg: No edema.      Left lower leg: No edema.   Lymphadenopathy:      Cervical: No cervical adenopathy.   Skin:     General: Skin is warm and dry.      Findings: Bruising present. No rash.   Neurological:      Mental Status: He is alert and oriented to person, place, and time. Mental status is at baseline.      Motor: No weakness.      Gait: Gait normal.   Psychiatric:         Mood and Affect: Mood normal.         Behavior: Behavior normal.         Thought Content: Thought content normal.         Judgment: Judgment normal.      Laboratory Reviewed  Lab Results   Component Value Date    WBC 5.87 01/13/2015    HGB 14.7 01/13/2015    HCT 44.5 01/13/2015     01/13/2015    MCV 91 01/13/2015    ALT 36 01/13/2015    AST 21 01/13/2015     04/17/2023    K 3.5 04/17/2023     04/17/2023    CREATININE 1.1 04/17/2023    BUN 14 04/17/2023    CO2 25 04/17/2023    MG 1.7 04/17/2023    TSH 1.835 01/13/2015    PSA 2.2 11/21/2022    HGBA1C 6.7 (H) 04/17/2023    CRP 5.4 01/13/2015     Lab Results   Component Value Date    CALCIUM 8.8 04/17/2023    PHOS 1.7 (L) 04/17/2023      Outside labs  4/13/23 chol 131  HDL 37 LDL 94 BMP glucose 171 K+ 3.0 ca 8.5 crt 1.2 eGFR 58.6 CBC H/H 13.2/41.2 plt 156    Cardiology Studies [facility]   PET Stress Test 5/23/23 Abnormal consistent w ischemia w 4 small reversible perfusion abnormalities noted    Echocardiogram 5/04/23 LVEF 55%. Grade 1 diastolic dysfunction. Dilatation of aortic root. Mild to mod AR    Assessment:   77 y.o. male with multiple co-morbid illnesses here for continued follow up of medical  problems.      The primary encounter diagnosis was Balance disorder. Diagnoses of Decreased mobility and endurance, Dorsalgia of lumbar region, Temporal encephalocele, Paroxysmal atrial fibrillation, Chronic diastolic congestive heart failure, Essential hypertension, Type 2 diabetes mellitus with diabetic nephropathy, without long-term current use of insulin, and Senile purpura were also pertinent to this visit.      Plan:   1. Balance disorder  -     Ambulatory referral/consult to Physical/Occupational Therapy; Future; Expected date: 06/09/2023    2. Decreased mobility and endurance  -     Ambulatory referral/consult to Physical/Occupational Therapy; Future; Expected date: 06/09/2023    3. Dorsalgia of lumbar region  -     Ambulatory referral/consult to Physical/Occupational Therapy; Future; Expected date: 06/09/2023    4. Temporal encephalocele  Overview:  Reynaldo Wagner MD - 04/06/2023 5:24 PM CDT [facility]  Case discussed at Neuroradiology Conference on Wednesday 4/5/23  On review of MRI and CT, there is extensive tegmen dehiscence over mastoid and epitympanum on the right side along with chronic effusion. There is concern for possible right temporal encephalocele and CSF leak. Semicircular canals intact.     Assessment & Plan:  Per Otolaryn Ann MIKY  Plan for R Middle Fossa Encephalocele Repair  Will need PCP and cardiac clearance  To contact [facility] when want to proceed w procedure  Advised to report to ER for any meningitis symptoms: high fever, ear infection, severe headache, stiff neck, confusion        5. Paroxysmal atrial fibrillation  Assessment & Plan:  Rate, rhythm controlled w current Rx - BP above goal - cont eliquis      6. Chronic diastolic congestive heart failure  Overview:  Echocardiogram 5/04/23 LVEF 55%. Grade 1 diastolic dysfunction. Dilatation of aortic root. Mild to mod AR    Assessment & Plan:  Grade 1 diastolic dysfunction noted on most recent echocardiogram - cont current Rx - limit  sodium - encouraged cont use of CPAP - goal BP < 130/80 if tolerates w/o orthostasis      7. Essential hypertension  Assessment & Plan:  BP not at goal - advised to contact his cardiologist - limit sodium - stress management - cont monitor home BP's for goal < 130/80 if tolerated      8. Type 2 diabetes mellitus with diabetic nephropathy, without long-term current use of insulin  Assessment & Plan:  He is in agreement w referral to diabetes management - has not been check BSs consistently - interested in CGM - fewer hypoglycemic symptoms though still occasional night sweats - Rybelsus co-apy too high - resumed glyburide-metformin - trial glyburide-metformin in am, metformin only in pm - advised to please check and chart BS's 2x daily optimally - work on healthier food/beverage choices and increasing daily physical activity      9. Senile purpura  Assessment & Plan:  Pt finds bothersome - advised long sleeves to better protect arms - cont eliquis       Other orders  -     glyBURIDE-metformin (GLUCOVANCE) 2.5-500 mg per tablet; Take 1 tablet by mouth daily with breakfast.  Dispense: 90 tablet; Refill: 0  -     metFORMIN (GLUCOPHAGE-XR) 500 MG ER 24hr tablet; Take 1 tablet (500 mg total) by mouth daily with dinner or evening meal. Take w meal higher in protein lower in carb  Dispense: 90 tablet; Refill: 3         Health Maintenance         Date Due Completion Date    Hepatitis C Screening Never done ---    Lipid Panel Never done ---    Pneumococcal Vaccines (Age 65+) (1 - PCV) Never done ---    Eye Exam Never done ---    TETANUS VACCINE Never done ---    Shingles Vaccine (1 of 2) Never done ---    COVID-19 Vaccine (4 - Pfizer series) 03/25/2023 11/25/2022    Influenza Vaccine (Season Ended) 09/01/2023 ---    Hemoglobin A1c 10/17/2023 4/17/2023    Diabetes Urine Screening 11/21/2023 11/21/2022            -Patient's lab results were reviewed and discussed with patient  -Treatment options and alternatives were discussed  with the patient. Patient expressed understanding. Patient was given the opportunity to ask questions and be an active participant in their medical care. Patient had no further questions or concerns at this time.   -Patient is an overall moderate to HIGH risk for health complications from their medical conditions.     Follow up: Follow up in about 6 weeks (around 7/17/2023) for Follow Up w me.    After visit summary printed and given to patient upon discharge.  Patient care plan included in After visit summary.    TOTAL TIME evaluating and managing this patient for this encounter was greater than 60 minutes. This time was spent personally by me on some of the following activities: review of patient's past medical history, assessing age-appropriate health maintenance needs, review of any interval history, review and interpretation of lab results, review and interpretation of imaging test results, review and interpretation of cardiology test results, reviewing consulting specialist notes, obtaining history from the patient and family, examination of the patient, medication reconciliation, managing and/or ordering prescription medications, ordering imaging tests, ordering referral to subspecialty provider(s), educating patient and answering their questions about diagnosis, treatment plan, and goals of treatment, discussing planned follow-up and final documentation of the visit. This time was exclusive of any separately billable procedures for this patient and exclusive of time spent treating any other patients.

## 2023-06-02 NOTE — PATIENT INSTRUCTIONS
Continue taking Glyburide-metformin 2.5 mg-500 mg with breakfast  Start taking Metformin 500 mg with dinner  Take w meals higher in protein, low in carb    Cont work on limiting sweets, sugary drinks and other simple carbs    Please check and chart fasting blood sugars before breakfast and before dinner let us know how they are running!    Please contact your cardiologist for advise regarding Systolic Blood Pressure over 160   (some in 170's highest 194) with Pulse 57-61    Recommend shingles vaccine, tetanus booster, and consider second dose of Covid Bivalent - can schedule at [facility]

## 2023-06-03 PROBLEM — D69.2 SENILE PURPURA: Status: ACTIVE | Noted: 2023-06-02

## 2023-06-03 NOTE — ASSESSMENT & PLAN NOTE
BP not at goal - advised to contact his cardiologist - limit sodium - stress management - cont monitor home BP's for goal < 130/80 if tolerated

## 2023-06-03 NOTE — ASSESSMENT & PLAN NOTE
Per Otolaryn Ann MIKY  Plan for R Middle Fossa Encephalocele Repair  Will need PCP and cardiac clearance  To contact [facility] when want to proceed w procedure  Advised to report to ER for any meningitis symptoms: high fever, ear infection, severe headache, stiff neck, confusion

## 2023-06-03 NOTE — ASSESSMENT & PLAN NOTE
Grade 1 diastolic dysfunction noted on most recent echocardiogram - cont current Rx - limit sodium - encouraged cont use of CPAP - goal BP < 130/80 if tolerates w/o orthostasis

## 2023-06-03 NOTE — ASSESSMENT & PLAN NOTE
He is in agreement w referral to diabetes management - has not been check BSs consistently - interested in CGM - fewer hypoglycemic symptoms though still occasional night sweats - Rybelsus co-apy too high - resumed glyburide-metformin - trial glyburide-metformin in am, metformin only in pm - advised to please check and chart BS's 2x daily optimally - work on healthier food/beverage choices and increasing daily physical activity

## 2023-06-08 ENCOUNTER — OFFICE VISIT (OUTPATIENT)
Dept: PRIMARY CARE CLINIC | Facility: CLINIC | Age: 78
End: 2023-06-08
Payer: MEDICARE

## 2023-06-08 ENCOUNTER — HOSPITAL ENCOUNTER (OUTPATIENT)
Dept: RADIOLOGY | Facility: HOSPITAL | Age: 78
Discharge: HOME | End: 2023-06-08
Attending: NURSE PRACTITIONER
Payer: MEDICARE

## 2023-06-08 VITALS
HEIGHT: 71 IN | HEART RATE: 52 BPM | TEMPERATURE: 98 F | SYSTOLIC BLOOD PRESSURE: 167 MMHG | OXYGEN SATURATION: 95 % | DIASTOLIC BLOOD PRESSURE: 72 MMHG | BODY MASS INDEX: 33.93 KG/M2 | WEIGHT: 242.38 LBS

## 2023-06-08 DIAGNOSIS — R10.9 FLANK PAIN: ICD-10-CM

## 2023-06-08 DIAGNOSIS — I10 ESSENTIAL HYPERTENSION: Chronic | ICD-10-CM

## 2023-06-08 DIAGNOSIS — R07.81 RIB PAIN ON RIGHT SIDE: Primary | ICD-10-CM

## 2023-06-08 DIAGNOSIS — M54.6 ACUTE RIGHT-SIDED THORACIC BACK PAIN: ICD-10-CM

## 2023-06-08 DIAGNOSIS — R07.81 RIB PAIN ON RIGHT SIDE: ICD-10-CM

## 2023-06-08 PROCEDURE — 71100 X-RAY EXAM RIBS UNI 2 VIEWS: CPT | Mod: 26,RT,, | Performed by: RADIOLOGY

## 2023-06-08 PROCEDURE — 99203 OFFICE O/P NEW LOW 30 MIN: CPT | Mod: 25,S$GLB,, | Performed by: NURSE PRACTITIONER

## 2023-06-08 PROCEDURE — 99203 PR OFFICE/OUTPT VISIT, NEW, LEVL III, 30-44 MIN: ICD-10-PCS | Mod: 25,S$GLB,, | Performed by: NURSE PRACTITIONER

## 2023-06-08 PROCEDURE — 1159F PR MEDICATION LIST DOCUMENTED IN MEDICAL RECORD: ICD-10-PCS | Mod: CPTII,S$GLB,, | Performed by: NURSE PRACTITIONER

## 2023-06-08 PROCEDURE — 1159F MED LIST DOCD IN RCRD: CPT | Mod: CPTII,S$GLB,, | Performed by: NURSE PRACTITIONER

## 2023-06-08 PROCEDURE — 96372 THER/PROPH/DIAG INJ SC/IM: CPT | Mod: S$GLB,,, | Performed by: NURSE PRACTITIONER

## 2023-06-08 PROCEDURE — 71100 XR RIBS 2 VIEW RIGHT: ICD-10-PCS | Mod: 26,RT,, | Performed by: RADIOLOGY

## 2023-06-08 PROCEDURE — 3077F SYST BP >= 140 MM HG: CPT | Mod: CPTII,S$GLB,, | Performed by: NURSE PRACTITIONER

## 2023-06-08 PROCEDURE — 71046 XR CHEST PA AND LATERAL: ICD-10-PCS | Mod: 26,,, | Performed by: RADIOLOGY

## 2023-06-08 PROCEDURE — 71100 X-RAY EXAM RIBS UNI 2 VIEWS: CPT | Mod: TC,FY,PO,RT

## 2023-06-08 PROCEDURE — 3078F DIAST BP <80 MM HG: CPT | Mod: CPTII,S$GLB,, | Performed by: NURSE PRACTITIONER

## 2023-06-08 PROCEDURE — 1125F AMNT PAIN NOTED PAIN PRSNT: CPT | Mod: CPTII,S$GLB,, | Performed by: NURSE PRACTITIONER

## 2023-06-08 PROCEDURE — 3077F PR MOST RECENT SYSTOLIC BLOOD PRESSURE >= 140 MM HG: ICD-10-PCS | Mod: CPTII,S$GLB,, | Performed by: NURSE PRACTITIONER

## 2023-06-08 PROCEDURE — 96372 PR INJECTION,THERAP/PROPH/DIAG2ST, IM OR SUBCUT: ICD-10-PCS | Mod: S$GLB,,, | Performed by: NURSE PRACTITIONER

## 2023-06-08 PROCEDURE — 1125F PR PAIN SEVERITY QUANTIFIED, PAIN PRESENT: ICD-10-PCS | Mod: CPTII,S$GLB,, | Performed by: NURSE PRACTITIONER

## 2023-06-08 PROCEDURE — 3288F PR FALLS RISK ASSESSMENT DOCUMENTED: ICD-10-PCS | Mod: CPTII,S$GLB,, | Performed by: NURSE PRACTITIONER

## 2023-06-08 PROCEDURE — 3288F FALL RISK ASSESSMENT DOCD: CPT | Mod: CPTII,S$GLB,, | Performed by: NURSE PRACTITIONER

## 2023-06-08 PROCEDURE — 71046 X-RAY EXAM CHEST 2 VIEWS: CPT | Mod: 26,,, | Performed by: RADIOLOGY

## 2023-06-08 PROCEDURE — 71046 X-RAY EXAM CHEST 2 VIEWS: CPT | Mod: TC,FY,PO

## 2023-06-08 PROCEDURE — 99999 PR PBB SHADOW E&M-EST. PATIENT-LVL IV: CPT | Mod: PBBFAC,,, | Performed by: NURSE PRACTITIONER

## 2023-06-08 PROCEDURE — 1101F PT FALLS ASSESS-DOCD LE1/YR: CPT | Mod: CPTII,S$GLB,, | Performed by: NURSE PRACTITIONER

## 2023-06-08 PROCEDURE — 99999 PR PBB SHADOW E&M-EST. PATIENT-LVL IV: ICD-10-PCS | Mod: PBBFAC,,, | Performed by: NURSE PRACTITIONER

## 2023-06-08 PROCEDURE — 1101F PR PT FALLS ASSESS DOC 0-1 FALLS W/OUT INJ PAST YR: ICD-10-PCS | Mod: CPTII,S$GLB,, | Performed by: NURSE PRACTITIONER

## 2023-06-08 PROCEDURE — 3078F PR MOST RECENT DIASTOLIC BLOOD PRESSURE < 80 MM HG: ICD-10-PCS | Mod: CPTII,S$GLB,, | Performed by: NURSE PRACTITIONER

## 2023-06-08 RX ORDER — KETOROLAC TROMETHAMINE 30 MG/ML
15 INJECTION, SOLUTION INTRAMUSCULAR; INTRAVENOUS
Status: DISCONTINUED | OUTPATIENT
Start: 2023-06-08 | End: 2023-06-08

## 2023-06-08 RX ORDER — KETOROLAC TROMETHAMINE 30 MG/ML
30 INJECTION, SOLUTION INTRAMUSCULAR; INTRAVENOUS
Status: DISCONTINUED | OUTPATIENT
Start: 2023-06-08 | End: 2023-06-08

## 2023-06-08 RX ORDER — TRAMADOL HYDROCHLORIDE 50 MG/1
50 TABLET ORAL EVERY 8 HOURS PRN
Qty: 30 TABLET | Refills: 0 | Status: SHIPPED | OUTPATIENT
Start: 2023-06-08

## 2023-06-08 RX ORDER — METHOCARBAMOL 500 MG/1
500 TABLET, FILM COATED ORAL 4 TIMES DAILY PRN
Qty: 30 TABLET | Refills: 0 | Status: SHIPPED | OUTPATIENT
Start: 2023-06-08 | End: 2023-06-18

## 2023-06-08 RX ORDER — KETOROLAC TROMETHAMINE 30 MG/ML
30 INJECTION, SOLUTION INTRAMUSCULAR; INTRAVENOUS
Status: COMPLETED | OUTPATIENT
Start: 2023-06-08 | End: 2023-06-08

## 2023-06-08 RX ADMIN — KETOROLAC TROMETHAMINE 30 MG: 30 INJECTION, SOLUTION INTRAMUSCULAR; INTRAVENOUS at 09:06

## 2023-06-08 NOTE — PROGRESS NOTES
"Freddie Palacio  06/09/2023  [id number]    Mirna Smith MD  Patient Care Team:  Mirna Smith MD as PCP - General (Internal Medicine)  Una Gar NP as Nurse Practitioner (Family Medicine)      [facility] Note      Chief Complaint:  Chief Complaint   Patient presents with    Back Pain     Back pain since 2 mth ago (Mid-back pain)         History of Present Illness:  Hx of mid back pain right sided. Flare x 2 months.  4 months ago swing broke he was sitting on not sure if this elicited the back pain  Daily intermittent right mid back - aggravated by sitting to standing position, getting out of bed, moving around in bed, raising up in chair  Alleviated with rest. Taking Tylenol sometimes at night.  Rates pain "8 - 10"  Causing poor sleep  Applied heating pad with some comfort    Did not take BP med today  Denies symptoms of HTN urgency    Pt denies fever, pleuritic chest pain, cough, urinary symptoms          The following were reviewed: Active problem list, medication list, allergies, family history, social history, and Health Maintenance.     History:  Past Medical History:   Diagnosis Date    A-fib     Bell's palsy     CHF (congestive heart failure)     Diabetes mellitus, type 2     Diverticulitis     GERD (gastroesophageal reflux disease)     Obstructive sleep apnea     Phimosis     Seasonal allergies      Past Surgical History:   Procedure Laterality Date    CARDIOVERSION      CATARACT EXTRACTION Right     Both eyes    CHOLECYSTECTOMY       Family History   Problem Relation Age of Onset    Diverticulitis Mother     COPD Mother     Diabetes Father     Stomach cancer Father     Diverticulitis Sister     Colon cancer Brother     Diabetes Maternal Grandmother     Heart failure Paternal Grandfather      Patient Active Problem List   Diagnosis    Obstructive sleep apnea    Facial nerve spasticity    Diabetes mellitus, type 2    GERD (gastroesophageal reflux disease)    Urinary frequency    " Diabetic nephropathy associated with type 2 diabetes mellitus    Obesity (BMI 30.0-34.9)    Dermatochalasis of both upper eyelids    Decreased mobility and endurance    BPH (benign prostatic hyperplasia)    Essential hypertension    Non-rheumatic aortic regurgitation    Non-rheumatic mitral regurgitation    Paroxysmal atrial fibrillation    Sensorineural hearing loss (SNHL) of left ear with restricted hearing of right ear    Diastasis of rectus abdominis    Chronic pain of left knee    Balance disorder    Hypokalemia    Temporal encephalocele    CHF (congestive heart failure)    Senile purpura    Acute right-sided thoracic back pain     Review of patient's allergies indicates:   Allergen Reactions    Sulfa (sulfonamide antibiotics) Itching    Sulfamethoxazole-trimethoprim Itching and Rash    (d)-limonene flavor     Cipro [ciprofloxacin hcl]      Per cardiologist: Not something patient should be prescribed in combo with one of his heart medications.     Ciprofloxacin      Per cardiologist: Not something patient should be prescribed in combo with one of his heart medications.        Medications:  Current Outpatient Medications on File Prior to Visit   Medication Sig Dispense Refill    amiodarone (PACERONE) 200 MG Tab Take 200 mg by mouth once daily.      amlodipine (NORVASC) 10 MG tablet Take 5 mg by mouth 2 (two) times daily.      apixaban (ELIQUIS) 5 mg Tab Take 5 mg by mouth 2 (two) times daily.      blood sugar diagnostic Strp 3 strips by Misc.(Non-Drug; Combo Route) route 3 (three) times daily as needed. Check fasting BS Qam and as needed for change in status 100 strip 5    furosemide (LASIX) 20 MG tablet Take 20 mg by mouth as needed.      glyBURIDE-metformin (GLUCOVANCE) 2.5-500 mg per tablet Take 1 tablet by mouth daily with breakfast. 90 tablet 0    lisinopril-hydrochlorothiazide (PRINZIDE,ZESTORETIC) 20-25 mg Tab Take 1 tablet by mouth once daily.      metFORMIN (GLUCOPHAGE-XR) 500 MG ER 24hr tablet Take 1  tablet (500 mg total) by mouth daily with dinner or evening meal. Take w meal higher in protein lower in carb 90 tablet 3    metoprolol succinate (TOPROL-XL) 100 MG 24 hr tablet 100 mg once daily.      omeprazole (PRILOSEC) 40 MG capsule Take 40 mg by mouth.      potassium chloride (KLOR-CON) 10 MEQ TbSR Take 10 mEq by mouth.      sodium,potassium,mag sulfates (SUPREP BOWEL PREP KIT) 17.5-3.13-1.6 gram SolR Take one bottle on the evening prior to your exam at 6 PM and the other bottle on the morning of your exam 4 hours prior to your arrival.      tamsulosin (FLOMAX) 0.4 mg Cap Take 1 capsule (0.4 mg total) by mouth once daily. 90 capsule 3     No current facility-administered medications on file prior to visit.       Medications have been reviewed and reconciled with patient at visit today.      Exam:  Vitals:    06/08/23 0821   BP: (!) 167/72   Pulse: (!) 52   Temp: 97.7 °F (36.5 °C)     Weight: 110 kg (242 lb 6.4 oz)   Body mass index is 33.81 kg/m².      BP Readings from Last 3 Encounters:   06/08/23 (!) 167/72   06/02/23 (!) 142/62   05/01/23 (!) 175/76     Wt Readings from Last 3 Encounters:   06/08/23 0821 110 kg (242 lb 6.4 oz)   06/02/23 1309 108.5 kg (239 lb 4.8 oz)   05/01/23 1035 107.4 kg (236 lb 11.2 oz)          Review of Systems   Constitutional:  Negative for chills, fever and malaise/fatigue.   HENT:  Negative for congestion, ear discharge, ear pain and sore throat.    Respiratory:  Negative for cough and shortness of breath.    Cardiovascular:  Negative for chest pain, palpitations and leg swelling.   Gastrointestinal:  Negative for abdominal pain, diarrhea, nausea and vomiting.   Genitourinary:  Positive for flank pain. Negative for dysuria, frequency and hematuria.   Musculoskeletal:  Positive for back pain. Negative for myalgias.   Neurological:  Negative for dizziness, tingling, focal weakness and headaches.   Psychiatric/Behavioral:  Negative for depression. The patient is not nervous/anxious.       Physical Exam  Vitals reviewed.   Constitutional:       General: He is not in acute distress.     Appearance: Normal appearance.   HENT:      Head: Normocephalic and atraumatic.      Nose: Nose normal.      Mouth/Throat:      Mouth: Mucous membranes are moist.   Eyes:      General: No scleral icterus.     Conjunctiva/sclera: Conjunctivae normal.   Cardiovascular:      Rate and Rhythm: Normal rate and regular rhythm.      Heart sounds: No murmur heard.  Pulmonary:      Effort: Pulmonary effort is normal. No respiratory distress.      Breath sounds: Normal breath sounds.   Abdominal:      Palpations: Abdomen is soft. There is no mass.      Tenderness: There is no abdominal tenderness.      Comments: Area of right flank TTP to muscular area    No right CVA tenderness   Musculoskeletal:         General: No swelling or deformity.      Cervical back: Normal range of motion and neck supple.      Right lower leg: No edema.      Left lower leg: No edema.   Lymphadenopathy:      Cervical: No cervical adenopathy.   Skin:     General: Skin is warm and dry.   Neurological:      Mental Status: He is alert and oriented to person, place, and time. Mental status is at baseline.   Psychiatric:         Mood and Affect: Mood normal.         Thought Content: Thought content normal.       Laboratory Reviewed:   Lab Results   Component Value Date    WBC 5.87 01/13/2015    HGB 14.7 01/13/2015    HCT 44.5 01/13/2015     01/13/2015    ALT 36 01/13/2015    AST 21 01/13/2015     04/17/2023    K 3.5 04/17/2023     04/17/2023    CREATININE 1.1 04/17/2023    BUN 14 04/17/2023    CO2 25 04/17/2023    TSH 1.835 01/13/2015    PSA 2.2 11/21/2022    HGBA1C 6.7 (H) 04/17/2023           Health Maintenance  Health Maintenance Topics with due status: Not Due       Topic Last Completion Date    Diabetes Urine Screening 11/21/2022    Hemoglobin A1c 04/17/2023    Influenza Vaccine Not Due     Health Maintenance Due   Topic Date Due     Hepatitis C Screening  Never done    Lipid Panel  Never done    Pneumococcal Vaccines (Age 65+) (1 - PCV) Never done    Eye Exam  Never done    TETANUS VACCINE  Never done    Shingles Vaccine (1 of 2) Never done    COVID-19 Vaccine (4 - Pfizer series) 03/25/2023       Assessment and Plan:  1. Rib pain on right side  -     X-Ray Chest PA And Lateral; Future; Expected date: 06/08/2023  -     Discontinue: ketorolac injection 15 mg  -     traMADoL (ULTRAM) 50 mg tablet; Take 1 tablet (50 mg total) by mouth every 8 (eight) hours as needed for Pain.  Dispense: 30 tablet; Refill: 0  -     methocarbamoL (ROBAXIN) 500 MG Tab; Take 1 tablet (500 mg total) by mouth 4 (four) times daily as needed (muscle spasm).  Dispense: 30 tablet; Refill: 0  -     X-Ray Ribs 2 View Right; Future; Expected date: 06/08/2023  -     Discontinue: ketorolac injection 30 mg    2. Flank pain  -     X-Ray Chest PA And Lateral; Future; Expected date: 06/08/2023    3. Acute right-sided thoracic back pain  Assessment & Plan:  No midline thoracic spine tenderness  Slight tenderness with palpation to muscle  No bruising, erythema or rash  CXR and rib xray - no acute findings.   Toradol IM x1,  Tramadol and low dose Robaxin  Rest the back, can apply warm heating pad or warming topical sports cream  If no improvement, return to clinic      4. Essential hypertension  Assessment & Plan:  Did not take antihypertensive this AM  Moderately elevated  Also, pt with back pain  Will return home and take medication      Other orders  -     ketorolac injection 30 mg                 -Patient's lab results were reviewed and discussed with patient  -Treatment options and alternatives were discussed with the patient. Patient expressed understanding. Patient was given the opportunity to ask questions and be an active participant in their medical care. Patient had no further questions or concerns at this time.         Future Appointments   Date Time Provider Department  [place]   6/16/2023 10:00 AM NURSE, BS [facility]   6/26/2023 12:30 [facility]   7/14/2023  9:00 AM Una Gar NP [facility]   7/14/2023 10:00 AM Mirna Smith MD [facility]   8/11/2023 10:15 AM Marcus Goldberg MD [facility] UROLOGY [facility]          After visit summary printed and given to patient upon discharge.  Patient goals and care plan are included in After visit summary.    Total medical decision making time was 30 min.    The following issues were discussed: The primary encounter diagnosis was Rib pain on right side. Diagnoses of Flank pain, Acute right-sided thoracic back pain, and Essential hypertension were also pertinent to this visit.    Health maintenance needs, recent test results and goals of care discussed with pt and questions answered.           KATIA Cervantes, NP-C  [facility] [address] [place] [id number]

## 2023-06-09 PROBLEM — M54.6 ACUTE RIGHT-SIDED THORACIC BACK PAIN: Status: ACTIVE | Noted: 2023-06-09

## 2023-06-09 NOTE — ASSESSMENT & PLAN NOTE
No midline thoracic spine tenderness  Slight tenderness with palpation to muscle  No bruising, erythema or rash  CXR and rib xray - no acute findings.   Toradol IM x1,  Tramadol and low dose Robaxin  Rest the back, can apply warm heating pad or warming topical sports cream  If no improvement, return to clinic

## 2023-06-09 NOTE — ASSESSMENT & PLAN NOTE
Did not take antihypertensive this AM  Moderately elevated  Also, pt with back pain  Will return home and take medication

## 2023-06-15 ENCOUNTER — PATIENT MESSAGE (OUTPATIENT)
Dept: PRIMARY CARE CLINIC | Facility: CLINIC | Age: 78
End: 2023-06-15
Payer: MEDICARE

## 2023-06-16 ENCOUNTER — CLINICAL SUPPORT (OUTPATIENT)
Dept: PRIMARY CARE CLINIC | Facility: CLINIC | Age: 78
End: 2023-06-16
Payer: MEDICARE

## 2023-06-16 ENCOUNTER — TELEPHONE (OUTPATIENT)
Dept: PRIMARY CARE CLINIC | Facility: CLINIC | Age: 78
End: 2023-06-16
Payer: MEDICARE

## 2023-06-16 VITALS — SYSTOLIC BLOOD PRESSURE: 130 MMHG | DIASTOLIC BLOOD PRESSURE: 76 MMHG

## 2023-06-16 NOTE — PROGRESS NOTES
Patient in office on this morning for his nurse visit NP check. Patient first reading was 162/70; Second reading 130/76. Patient was informed of the information and verbally understood the information given.

## 2023-06-17 ENCOUNTER — DOCUMENTATION ONLY (OUTPATIENT)
Dept: PRIMARY CARE CLINIC | Facility: CLINIC | Age: 78
End: 2023-06-17
Payer: MEDICARE

## 2023-06-17 NOTE — PROGRESS NOTES
[facility] Stress 5/23/23 Dr. Yahir Beltran  Abnormal. Ischemia. Mod risk future CV events.  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

## 2023-06-20 ENCOUNTER — TELEPHONE (OUTPATIENT)
Dept: PRIMARY CARE CLINIC | Facility: CLINIC | Age: 78
End: 2023-06-20
Payer: MEDICARE

## 2023-06-22 RX ORDER — METHOCARBAMOL 500 MG/1
500 TABLET, FILM COATED ORAL 4 TIMES DAILY
COMMUNITY
End: 2023-06-22 | Stop reason: SDUPTHER

## 2023-06-22 RX ORDER — OLMESARTAN MEDOXOMIL AND HYDROCHLOROTHIAZIDE 40/25 40; 25 MG/1; MG/1
1 TABLET ORAL DAILY
COMMUNITY

## 2023-06-22 RX ORDER — METHOCARBAMOL 500 MG/1
500 TABLET, FILM COATED ORAL 4 TIMES DAILY PRN
Qty: 30 TABLET | Refills: 0 | Status: SHIPPED | OUTPATIENT
Start: 2023-06-22

## 2023-06-22 RX ORDER — CARVEDILOL 25 MG/1
25 TABLET ORAL 2 TIMES DAILY WITH MEALS
COMMUNITY

## 2023-06-22 RX ORDER — ROSUVASTATIN CALCIUM 10 MG/1
10 TABLET, COATED ORAL DAILY
COMMUNITY

## 2023-06-22 NOTE — TELEPHONE ENCOUNTER
----- Message from Gonzalo Pierce sent at 6/22/2023 10:04 AM CDT -----  Contact: [phone]  Medication refill for methocarbamoL (ROBAXIN) 500 MG Tab    Also requesting a call back with questions.

## 2023-07-17 ENCOUNTER — OFFICE VISIT (OUTPATIENT)
Dept: PRIMARY CARE CLINIC | Facility: CLINIC | Age: 78
End: 2023-07-17
Payer: MEDICARE

## 2023-07-17 ENCOUNTER — HOSPITAL ENCOUNTER (OUTPATIENT)
Dept: RADIOLOGY | Facility: HOSPITAL | Age: 78
Discharge: HOME | End: 2023-07-17
Attending: NURSE PRACTITIONER
Payer: MEDICARE

## 2023-07-17 VITALS
TEMPERATURE: 98 F | HEART RATE: 60 BPM | BODY MASS INDEX: 34.05 KG/M2 | HEIGHT: 71 IN | OXYGEN SATURATION: 95 % | WEIGHT: 243.19 LBS | DIASTOLIC BLOOD PRESSURE: 66 MMHG | SYSTOLIC BLOOD PRESSURE: 160 MMHG
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
## 2023-07-17 DIAGNOSIS — R07.81 RIB PAIN ON RIGHT SIDE: ICD-10-CM

## 2023-07-17 PROCEDURE — 3078F DIAST BP <80 MM HG: CPT | Mod: CPTII,S$GLB,, | Performed by: NURSE PRACTITIONER

## 2023-07-17 PROCEDURE — 99213 OFFICE O/P EST LOW 20 MIN: CPT | Mod: S$GLB,,, | Performed by: NURSE PRACTITIONER

## 2023-07-17 PROCEDURE — 1101F PT FALLS ASSESS-DOCD LE1/YR: CPT | Mod: CPTII,S$GLB,, | Performed by: NURSE PRACTITIONER

## 2023-07-17 PROCEDURE — 1125F AMNT PAIN NOTED PAIN PRSNT: CPT | Mod: CPTII,S$GLB,, | Performed by: NURSE PRACTITIONER

## 2023-07-17 PROCEDURE — 3288F PR FALLS RISK ASSESSMENT DOCUMENTED: ICD-10-PCS | Mod: CPTII,S$GLB,, | Performed by: NURSE PRACTITIONER

## 2023-07-17 PROCEDURE — 71046 XR CHEST PA AND LATERAL: ICD-10-PCS | Mod: 26,,, | Performed by: RADIOLOGY

## 2023-07-17 PROCEDURE — 99999 PR PBB SHADOW E&M-EST. PATIENT-LVL III: ICD-10-PCS | Mod: PBBFAC,,, | Performed by: NURSE PRACTITIONER

## 2023-07-17 PROCEDURE — 3288F FALL RISK ASSESSMENT DOCD: CPT | Mod: CPTII,S$GLB,, | Performed by: NURSE PRACTITIONER

## 2023-07-17 PROCEDURE — 99999 PR PBB SHADOW E&M-EST. PATIENT-LVL III: CPT | Mod: PBBFAC,,, | Performed by: NURSE PRACTITIONER

## 2023-07-17 PROCEDURE — 3077F SYST BP >= 140 MM HG: CPT | Mod: CPTII,S$GLB,, | Performed by: NURSE PRACTITIONER

## 2023-07-17 PROCEDURE — 1101F PR PT FALLS ASSESS DOC 0-1 FALLS W/OUT INJ PAST YR: ICD-10-PCS | Mod: CPTII,S$GLB,, | Performed by: NURSE PRACTITIONER

## 2023-07-17 PROCEDURE — 71046 X-RAY EXAM CHEST 2 VIEWS: CPT | Mod: TC,FY,PO

## 2023-07-17 PROCEDURE — 1125F PR PAIN SEVERITY QUANTIFIED, PAIN PRESENT: ICD-10-PCS | Mod: CPTII,S$GLB,, | Performed by: NURSE PRACTITIONER

## 2023-07-17 PROCEDURE — 3077F PR MOST RECENT SYSTOLIC BLOOD PRESSURE >= 140 MM HG: ICD-10-PCS | Mod: CPTII,S$GLB,, | Performed by: NURSE PRACTITIONER

## 2023-07-17 PROCEDURE — 71046 X-RAY EXAM CHEST 2 VIEWS: CPT | Mod: 26,,, | Performed by: RADIOLOGY

## 2023-07-17 PROCEDURE — 3078F PR MOST RECENT DIASTOLIC BLOOD PRESSURE < 80 MM HG: ICD-10-PCS | Mod: CPTII,S$GLB,, | Performed by: NURSE PRACTITIONER

## 2023-07-17 PROCEDURE — 99213 PR OFFICE/OUTPT VISIT, EST, LEVL III, 20-29 MIN: ICD-10-PCS | Mod: S$GLB,,, | Performed by: NURSE PRACTITIONER

## 2023-07-17 RX ORDER — HYDROCODONE BITARTRATE AND ACETAMINOPHEN 5; 325 MG/1; MG/1
1 TABLET ORAL EVERY 6 HOURS PRN
Qty: 12 TABLET | Refills: 0 | Status: SHIPPED | OUTPATIENT
Start: 2023-07-17

## 2023-07-17 RX ORDER — ONDANSETRON 4 MG/1
4 TABLET, ORALLY DISINTEGRATING ORAL EVERY 8 HOURS PRN
Qty: 12 TABLET | Refills: 0 | Status: SHIPPED | OUTPATIENT
Start: 2023-07-17

## 2023-07-17 NOTE — PATIENT INSTRUCTIONS
Melatonin 10 mg nightly or Benadryl 25 mg as needed for sleep    Take either or - not both at one time

## 2023-07-17 NOTE — PROGRESS NOTES
Freddie Palacio  07/17/2023  [id number]    Mirna Smith MD  Patient Care Team:  Mirna Smith MD as PCP - General (Internal Medicine)  Una Gar NP as Nurse Practitioner (Family Medicine)      [facility]      Chief Complaint:  Chief Complaint   Patient presents with    Fall     Patient tripped and fell over box at his front door.       History of Present Illness:  Presents reporting a mechanical fall 5 days ago. Walking, hit wooden object with right foot which made him off balance, fell straight forward. Fell on hard wall  Reports pain to the right rib cage - 9 or 10th rib  Pain increases trying to get OOB or with movement. Sharp stabbing  Has to lie on left side for comfort. When lying on right side, area starts with throbbing sensation  Alleviating factor - no movement  Took Tramadol x 1 and did not notice any improvement.  Had a vomiting episode with Tramadol but now no further symptoms.      Denies pleuritic chest pain, no SOB, cough or LOPES.                   The following were reviewed: Active problem list, medication list, allergies, family history, social history, and Health Maintenance.     History:  Past Medical History:   Diagnosis Date    A-fib     Bell's palsy     CHF (congestive heart failure)     Diabetes mellitus, type 2     Diverticulitis     GERD (gastroesophageal reflux disease)     Obstructive sleep apnea     Phimosis     Seasonal allergies      Past Surgical History:   Procedure Laterality Date    CARDIOVERSION      CATARACT EXTRACTION Right     Both eyes    CHOLECYSTECTOMY       Family History   Problem Relation Age of Onset    Diverticulitis Mother     COPD Mother     Diabetes Father     Stomach cancer Father     Diverticulitis Sister     Colon cancer Brother     Diabetes Maternal Grandmother     Heart failure Paternal Grandfather      Patient Active Problem List   Diagnosis    Obstructive sleep apnea    Facial nerve spasticity    Diabetes mellitus, type 2     GERD (gastroesophageal reflux disease)    Urinary frequency    Diabetic nephropathy associated with type 2 diabetes mellitus    Obesity (BMI 30.0-34.9)    Dermatochalasis of both upper eyelids    Decreased mobility and endurance    BPH (benign prostatic hyperplasia)    Essential hypertension    Non-rheumatic aortic regurgitation    Non-rheumatic mitral regurgitation    Paroxysmal atrial fibrillation    Sensorineural hearing loss (SNHL) of left ear with restricted hearing of right ear    Diastasis of rectus abdominis    Chronic pain of left knee    Balance disorder    Hypokalemia    Temporal encephalocele    CHF (congestive heart failure)    Senile purpura    Acute right-sided thoracic back pain     Review of patient's allergies indicates:   Allergen Reactions    Sulfa (sulfonamide antibiotics) Itching    Sulfamethoxazole-trimethoprim Itching and Rash    (d)-limonene flavor     Cipro [ciprofloxacin hcl]      Per cardiologist: Not something patient should be prescribed in combo with one of his heart medications.     Ciprofloxacin      Per cardiologist: Not something patient should be prescribed in combo with one of his heart medications.        Medications:  Current Outpatient Medications on File Prior to Visit   Medication Sig Dispense Refill    amiodarone (PACERONE) 200 MG Tab Take 200 mg by mouth once daily.      amlodipine (NORVASC) 10 MG tablet Take 5 mg by mouth 2 (two) times daily.      apixaban (ELIQUIS) 5 mg Tab Take 5 mg by mouth 2 (two) times daily.      blood sugar diagnostic Strp 3 strips by Misc.(Non-Drug; Combo Route) route 3 (three) times daily as needed. Check fasting BS Qam and as needed for change in status 100 strip 5    carvediloL (COREG) 25 MG tablet Take 25 mg by mouth 2 (two) times daily with meals.      furosemide (LASIX) 20 MG tablet Take 20 mg by mouth as needed.      glyBURIDE-metformin (GLUCOVANCE) 2.5-500 mg per tablet Take 1 tablet by mouth daily with breakfast. 90 tablet 0    metFORMIN  (GLUCOPHAGE-XR) 500 MG ER 24hr tablet Take 1 tablet (500 mg total) by mouth daily with dinner or evening meal. Take w meal higher in protein lower in carb 90 tablet 3    methocarbamoL (ROBAXIN) 500 MG Tab Take 1 tablet (500 mg total) by mouth 4 (four) times daily as needed (muscle spasm). 30 tablet 0    olmesartan-hydrochlorothiazide (BENICAR HCT) 40-25 mg per tablet Take 1 tablet by mouth once daily.      omeprazole (PRILOSEC) 40 MG capsule Take 40 mg by mouth.      potassium chloride (KLOR-CON) 10 MEQ TbSR Take 10 mEq by mouth.      rosuvastatin (CRESTOR) 10 MG tablet Take 10 mg by mouth once daily.      sodium,potassium,mag sulfates (SUPREP BOWEL PREP KIT) 17.5-3.13-1.6 gram SolR Take one bottle on the evening prior to your exam at 6 PM and the other bottle on the morning of your exam 4 hours prior to your arrival.      tamsulosin (FLOMAX) 0.4 mg Cap Take 1 capsule (0.4 mg total) by mouth once daily. 90 capsule 3    traMADoL (ULTRAM) 50 mg tablet Take 1 tablet (50 mg total) by mouth every 8 (eight) hours as needed for Pain. 30 tablet 0     No current facility-administered medications on file prior to visit.       Medications have been reviewed and reconciled with patient at visit today.      Exam:  Vitals:    07/17/23 1236   BP: (!) 160/66   Pulse: 60   Temp: 97.6 °F (36.4 °C)     Weight: 110.3 kg (243 lb 3.2 oz)   Body mass index is 33.92 kg/m².      BP Readings from Last 3 Encounters:   07/17/23 (!) 160/66   06/16/23 130/76   06/08/23 (!) 167/72     Wt Readings from Last 3 Encounters:   07/17/23 1236 110.3 kg (243 lb 3.2 oz)   06/08/23 0821 110 kg (242 lb 6.4 oz)   06/02/23 1309 108.5 kg (239 lb 4.8 oz)        REVIEW OF SYSTEMS  Review of Systems   Constitutional:  Negative for chills, fever and malaise/fatigue.   HENT:  Negative for congestion, ear discharge, ear pain and sore throat.    Respiratory:  Negative for cough and shortness of breath.    Cardiovascular:  Negative for chest pain, palpitations and leg  swelling.   Gastrointestinal:  Negative for abdominal pain, diarrhea, nausea and vomiting.   Genitourinary:  Negative for dysuria and frequency.   Musculoskeletal:  Positive for back pain and falls. Negative for myalgias.        Pain to right rib cage   Neurological:  Negative for dizziness, focal weakness and headaches.   Psychiatric/Behavioral:  Negative for depression. The patient is not nervous/anxious.      Physical Exam  Vitals reviewed.   Constitutional:       General: He is not in acute distress.     Appearance: Normal appearance.   HENT:      Head: Normocephalic and atraumatic.      Nose: Nose normal.      Mouth/Throat:      Mouth: Mucous membranes are moist.   Eyes:      General: No scleral icterus.     Conjunctiva/sclera: Conjunctivae normal.   Cardiovascular:      Rate and Rhythm: Normal rate and regular rhythm.      Heart sounds: No murmur heard.  Pulmonary:      Effort: Pulmonary effort is normal. No respiratory distress.      Breath sounds: Normal breath sounds.   Chest:      Chest wall: Tenderness present.          Comments: No obvious contusion, skin intact  TTP at site  Abdominal:      Palpations: Abdomen is soft. There is no mass.      Tenderness: There is no abdominal tenderness.   Musculoskeletal:         General: No swelling or deformity. Normal range of motion.      Cervical back: Normal range of motion and neck supple.      Right lower leg: No edema.      Left lower leg: No edema.   Lymphadenopathy:      Cervical: No cervical adenopathy.   Skin:     General: Skin is warm and dry.   Neurological:      Mental Status: He is alert and oriented to person, place, and time. Mental status is at baseline.   Psychiatric:         Mood and Affect: Mood normal.         Thought Content: Thought content normal.       Laboratory Reviewed:     Lab Results   Component Value Date    WBC 5.87 01/13/2015    HGB 14.7 01/13/2015    HCT 44.5 01/13/2015     01/13/2015    ALT 36 01/13/2015    AST 21 01/13/2015      04/17/2023    K 3.5 04/17/2023     04/17/2023    CREATININE 1.1 04/17/2023    BUN 14 04/17/2023    CO2 25 04/17/2023    TSH 1.835 01/13/2015    PSA 2.2 11/21/2022    HGBA1C 6.7 (H) 04/17/2023       Screening or Prevention Patient's value Goal Complete/Controlled?   HgA1C Testing and Control   Lab Results   Component Value Date    HGBA1C 6.7 (H) 04/17/2023      Annually/Less than 8% Yes   Lipid profile Most Recent Lipid Panel Health Maintenance Topic Completion: Not Found Annually No   LDL control No results found for: LDLCALC Annually/Less than 100 mg/dl  No   Nephropathy screening Lab Results   Component Value Date    LABMICR 170.0 11/21/2022     Lab Results   Component Value Date    PROTEINUA Trace (A) 12/06/2022    Annually Yes   Blood pressure BP Readings from Last 1 Encounters:   07/17/23 (!) 160/66    Less than 140/90 No   Dilated retinal exam Most Recent Eye Exam Date: Not Found Annually No   Foot exam   Most Recent Foot Exam Date: Not Found Annually Yes       Health Maintenance  Health Maintenance Topics with due status: Not Due       Topic Last Completion Date    Diabetes Urine Screening 11/21/2022    Hemoglobin A1c 04/17/2023    Influenza Vaccine Not Due     Health Maintenance Due   Topic Date Due    Hepatitis C Screening  Never done    Lipid Panel  Never done    Pneumococcal Vaccines (Age 65+) (1 - PCV) Never done    Eye Exam  Never done    TETANUS VACCINE  Never done    Shingles Vaccine (1 of 2) Never done    COVID-19 Vaccine (4 - Pfizer series) 03/25/2023       Assessment and Plan:  1. Fall, initial encounter  -     X-Ray Chest PA And Lateral; Future; Expected date: 07/17/2023  -     ondansetron (ZOFRAN-ODT) 4 MG TbDL; Take 1 tablet (4 mg total) by mouth every 8 (eight) hours as needed (nausea/vomiting).  Dispense: 12 tablet; Refill: 0  -     HYDROcodone-acetaminophen (NORCO) 5-325 mg per tablet; Take 1 tablet by mouth every 6 (six) hours as needed for Pain.  Dispense: 12 tablet; Refill:  0    2. Rib pain on right side  -     X-Ray Chest PA And Lateral; Future; Expected date: 07/17/2023  -     HYDROcodone-acetaminophen (NORCO) 5-325 mg per tablet; Take 1 tablet by mouth every 6 (six) hours as needed for Pain.  Dispense: 12 tablet; Refill: 0     Warm heating pad to the tender area  Tylenol as needed for pain            -Patient's lab results were reviewed and discussed with patient  -Treatment options and alternatives were discussed with the patient. Patient expressed understanding. Patient was given the opportunity to ask questions and be an active participant in their medical care. Patient had no further questions or concerns at this time.         Future Appointments   Date Time Provider Department Center   8/1/2023  2:00 PM Una Gar NP [facility]   8/11/2023 10:15 AM Marcus Goldberg MD [facility] UROLOGY [facility]          After visit summary printed and given to patient upon discharge.  Patient goals and care plan are included in After visit summary.    Total medical decision making time was 27 min.    The following issues were discussed: The primary encounter diagnosis was Fall, initial encounter. A diagnosis of Rib pain on right side was also pertinent to this visit.    Health maintenance needs, recent test results and goals of care discussed with pt and questions answered.           KATIA Cervantes, NP-C  [facility] [id number] [place] [id number]

## 2024-01-08 ENCOUNTER — TELEPHONE (OUTPATIENT)
Dept: PRIMARY CARE CLINIC | Facility: CLINIC | Age: 79
End: 2024-01-08
Payer: MEDICARE

## 2024-03-10 DIAGNOSIS — N40.1 BENIGN PROSTATIC HYPERPLASIA WITH WEAK URINARY STREAM: ICD-10-CM

## 2024-03-10 DIAGNOSIS — R39.12 BENIGN PROSTATIC HYPERPLASIA WITH WEAK URINARY STREAM: ICD-10-CM

## 2024-03-14 RX ORDER — TAMSULOSIN HYDROCHLORIDE 0.4 MG/1
1 CAPSULE ORAL
Qty: 90 CAPSULE | Refills: 3 | Status: SHIPPED | OUTPATIENT
Start: 2024-03-14

## 2024-04-15 ENCOUNTER — PATIENT MESSAGE (OUTPATIENT)
Dept: ADMINISTRATIVE | Facility: HOSPITAL | Age: 79
End: 2024-04-15
Payer: MEDICARE

## 2024-11-12 ENCOUNTER — TELEPHONE (OUTPATIENT)
Dept: PRIMARY CARE CLINIC | Facility: CLINIC | Age: 79
End: 2024-11-12
Payer: MEDICARE

## 2024-11-12 NOTE — TELEPHONE ENCOUNTER
Called pt's significant other to schedule appointment with Dr. Smith, I got no answer but left a voicemail.    SJ

## 2025-03-06 ENCOUNTER — PATIENT MESSAGE (OUTPATIENT)
Dept: ADMINISTRATIVE | Facility: HOSPITAL | Age: 80
End: 2025-03-06
Payer: MEDICARE

## 2025-05-07 ENCOUNTER — PATIENT OUTREACH (OUTPATIENT)
Dept: ADMINISTRATIVE | Facility: HOSPITAL | Age: 80
End: 2025-05-07
Payer: MEDICARE